# Patient Record
Sex: FEMALE | Race: WHITE | NOT HISPANIC OR LATINO | Employment: OTHER | URBAN - NONMETROPOLITAN AREA
[De-identification: names, ages, dates, MRNs, and addresses within clinical notes are randomized per-mention and may not be internally consistent; named-entity substitution may affect disease eponyms.]

---

## 2024-06-11 RX ORDER — DILTIAZEM HYDROCHLORIDE 60 MG/1
60 TABLET, FILM COATED ORAL DAILY
COMMUNITY

## 2024-06-11 RX ORDER — LISINOPRIL 20 MG/1
20 TABLET ORAL 2 TIMES DAILY
COMMUNITY

## 2024-06-11 RX ORDER — PROPRANOLOL HCL 60 MG
60 CAPSULE, EXTENDED RELEASE 24HR ORAL DAILY
COMMUNITY

## 2024-06-11 RX ORDER — WARFARIN SODIUM 5 MG/1
5 TABLET ORAL
COMMUNITY

## 2024-06-11 RX ORDER — LEVOTHYROXINE SODIUM 0.1 MG/1
100 TABLET ORAL DAILY
COMMUNITY

## 2024-06-11 RX ORDER — FERROUS SULFATE 325(65) MG
325 TABLET ORAL
COMMUNITY

## 2024-06-11 RX ORDER — FUROSEMIDE 40 MG/1
40 TABLET ORAL DAILY
COMMUNITY

## 2024-06-17 ENCOUNTER — HOSPITAL ENCOUNTER (OUTPATIENT)
Dept: CARDIOLOGY | Facility: HOSPITAL | Age: 89
Discharge: HOME | End: 2024-06-17
Payer: MEDICARE

## 2024-06-17 ENCOUNTER — OFFICE VISIT (OUTPATIENT)
Dept: CARDIOLOGY | Facility: CLINIC | Age: 89
End: 2024-06-17
Payer: MEDICARE

## 2024-06-17 VITALS
HEART RATE: 62 BPM | WEIGHT: 135 LBS | BODY MASS INDEX: 19.33 KG/M2 | DIASTOLIC BLOOD PRESSURE: 73 MMHG | SYSTOLIC BLOOD PRESSURE: 140 MMHG | OXYGEN SATURATION: 94 % | HEIGHT: 70 IN

## 2024-06-17 DIAGNOSIS — I36.1 NONRHEUMATIC TRICUSPID VALVE REGURGITATION: ICD-10-CM

## 2024-06-17 DIAGNOSIS — I35.0 NONRHEUMATIC AORTIC VALVE STENOSIS: Primary | ICD-10-CM

## 2024-06-17 DIAGNOSIS — I35.0 SEVERE AORTIC STENOSIS: ICD-10-CM

## 2024-06-17 DIAGNOSIS — I48.21 PERMANENT ATRIAL FIBRILLATION: ICD-10-CM

## 2024-06-17 PROCEDURE — 1159F MED LIST DOCD IN RCRD: CPT | Performed by: INTERNAL MEDICINE

## 2024-06-17 PROCEDURE — 1160F RVW MEDS BY RX/DR IN RCRD: CPT | Performed by: INTERNAL MEDICINE

## 2024-06-17 PROCEDURE — 93000 ELECTROCARDIOGRAM COMPLETE: CPT | Performed by: INTERNAL MEDICINE

## 2024-06-17 PROCEDURE — 99204 OFFICE O/P NEW MOD 45 MIN: CPT | Performed by: INTERNAL MEDICINE

## 2024-06-17 NOTE — PROGRESS NOTES
"    Subjective:     Encounter Date:06/17/2024      Patient ID: Juany Elizabeth is a 88 y.o. female with valvular heart disease including aortic valve stenosis, tricuspid valve regurgitation, also permanent atrial fibrillation, referred here for further evaluation of valvular heart disease.  The patient has previously seen Dr. Rene Maurer at an outside facility.    Chief Complaint: \" I feel terrible.\"    History of Present Illness    This is an 88-year-old female who presents today for further evaluation management of valvular heart disease.  The patient notes that she was hospitalized in May for the first time ever with fluid overload.  She was diuresed approximately 15 pounds and felt much better.  She says that earlier in the year, she was having issues with nausea and abdominal pain and was told she might need a gallbladder out was sent to cardiology for further evaluation.  The patient has a longstanding history of atrial fibrillation and has undergone an ablation that was not successful therefore she has permanent atrial fibrillation.  She is followed by Dr. Diego and is chronically anticoagulated with warfarin.  She denies having any palpitations at this time.  During her workup by cardiology for her possible gallbladder issue, she was found to have valvular heart disease.  Then subsequently, she was hospitalized with what sounds like heart failure with significant volume overload, weight gain, edema, shortness of breath.  The patient notes that since that time, she has had no trouble with significant shortness of breath or dyspnea with exertion.  She does have some mild edema.  She has not noticed rapid weight gain but says that she fears that the fluid will accumulate once again.      Current Outpatient Medications:     dilTIAZem (CARDIZEM) 60 MG tablet, Take 1 tablet by mouth Daily., Disp: , Rfl:     ferrous sulfate 325 (65 FE) MG tablet, Take 1 tablet by mouth Daily With Breakfast., Disp: , Rfl:     " furosemide (LASIX) 40 MG tablet, Take 1 tablet by mouth Daily., Disp: , Rfl:     levothyroxine (SYNTHROID, LEVOTHROID) 100 MCG tablet, Take 1 tablet by mouth Daily., Disp: , Rfl:     lisinopril (PRINIVIL,ZESTRIL) 20 MG tablet, Take 1 tablet by mouth 2 (Two) Times a Day., Disp: , Rfl:     propranolol LA (INDERAL LA) 60 MG 24 hr capsule, Take 1 capsule by mouth Daily., Disp: , Rfl:     warfarin (COUMADIN) 5 MG tablet, Take 1 tablet by mouth Daily., Disp: , Rfl:     No Known Allergies    Social History     Tobacco Use    Smoking status: Never    Smokeless tobacco: Never   Substance Use Topics    Alcohol use: Never     Review of Systems   Constitutional: Positive for malaise/fatigue. Negative for fever, weight gain and weight loss.   Cardiovascular:  Positive for dyspnea on exertion and leg swelling. Negative for chest pain, claudication, orthopnea, palpitations, paroxysmal nocturnal dyspnea and syncope.   Respiratory:  Positive for shortness of breath. Negative for cough and wheezing.    Endocrine: Negative for cold intolerance and heat intolerance.   Hematologic/Lymphatic: Negative for bleeding problem. Does not bruise/bleed easily.   Gastrointestinal:  Negative for abdominal pain, hematemesis, hematochezia, melena, nausea and vomiting.   Neurological:  Positive for weakness. Negative for dizziness, headaches and loss of balance.   All other systems reviewed and are negative.      ECG 12 Lead    Date/Time: 6/17/2024 3:11 PM  Performed by: Jerry Kim MD    Authorized by: Jerry Kim MD  Comparison: compared with previous ECG   Similar to previous ECG  Rhythm: atrial fibrillation  Rate: normal  BPM: 62  Conduction: conduction normal  QRS axis: left  Other findings: non-specific ST-T wave changes and poor R wave progression    Clinical impression: abnormal EKG           Objective:     Vitals reviewed.   Constitutional:       General: Not in acute distress.     Appearance: Well-developed and not  "in distress. Chronically ill-appearing.   Eyes:      Extraocular Movements: Extraocular movements intact.      Pupils: Pupils are equal, round, and reactive to light.   HENT:      Head: Normocephalic and atraumatic.    Mouth/Throat:      Pharynx: Oropharynx is clear.   Neck:      Vascular: No carotid bruit or JVD.   Pulmonary:      Effort: Pulmonary effort is normal.      Breath sounds: Normal breath sounds. No wheezing. No rhonchi. No rales.   Cardiovascular:      Normal rate. Irregularly irregular rhythm.      Murmurs: There is a grade 2/6 harsh midsystolic murmur at the URSB.      No gallop.    Pulses:     Intact distal pulses.   Edema:     Peripheral edema present.  Abdominal:      General: Bowel sounds are normal. There is no distension.      Palpations: Abdomen is soft.      Tenderness: There is no abdominal tenderness.   Musculoskeletal: Normal range of motion.      Extremities: No clubbing present.     Cervical back: Normal range of motion and neck supple. Skin:     General: Skin is warm and dry. There is no cyanosis.      Findings: No erythema or rash.   Neurological:      Mental Status: Alert and oriented to person, place, and time.      Cranial Nerves: No cranial nerve deficit.      Motor: Tremor present.       /73   Pulse 62   Ht 177.8 cm (70\")   Wt 61.2 kg (135 lb)   SpO2 94%   BMI 19.37 kg/m²     Data/Lab Review:     I did review the echocardiogram that was performed at an outside facility on 1/29/2024 and after the review my interpretation is as follows: Mild concentric left ventricular hypertrophy.  Preserved left ventricular systolic function.  Mild mitral annular calcification with mild mitral valve regurgitation.  Aortic valve is thickened and calcified.  There appears to be trace aortic valve regurgitation.  Aortic valve hemodynamic metrics support moderate aortic valve stenosis but likely close to moderate to severe.  No peak velocity exceeds 4 m/s, no mean gradient exceeds 40 mm/s " and dimensionless index does not appear to be less than 0.25.  There appears to be likely severe tricuspid valve regurgitation with severe right atrial enlargement.  By TAPSE, right ventricular systolic function appears to be preserved however the right ventricular cavity appears to be mildly dilated.      Assessment:          Diagnosis Plan   1. Nonrheumatic aortic valve stenosis  ECG 12 Lead      2. Nonrheumatic tricuspid valve regurgitation        3. Permanent atrial fibrillation               Plan:       1.  Nonrheumatic aortic valve stenosis: The patient was referred here for possible severe aortic valve stenosis.  Looking at her echocardiogram, the aortic valve appears to be moderate to severely stenotic but does not quite fulfill all criteria to be categorized as severely stenotic.  We did discuss that TAVR would be indicated for severe symptomatic aortic valve stenosis but not necessarily moderate to severe.  Now, the presence of mixed valvular disease might complicate things for this patient as well.  As, simply fixing the aortic valve without considering therapy for the tricuspid valve might be unwise given that the patient's shortness of breath, fluid retention, etc. may be just as significantly contributed to by tricuspid valve regurgitation with the severity that this patient has, as would her aortic valve stenosis.  I recommended a repeat echocardiogram.  The patient very frankly said that she did not want to get another echocardiogram.  I also discussed that if she did not want to have another echocardiogram, then she certainly would not be in favor most likely of other testing which would include CT scan, heart catheterization or any other testing that might be needed.  The patient was very hesitant to proceed.  She also said that she would like her care to be at [facility] in [place] if at all possible as she has a longstanding relationship with Dr. Diego but was told that cardiology there would  not see her unless she had a ALIZA first.  I discussed that I did not think this was absolutely the case but that likely the physicians there were trying to decrease the amount of trips in and out of [place].  It may simply be that the patient's case could be reviewed by the physicians there and a determination made about whether or not they could help with her case.  Therefore, the patient and family will try to discuss further with Dr. Diego about potentially reviewing the case with structural cardiology at [place].  Given the patient's absolute hesitation to proceed with any noninvasive testing, I did very frankly tell the patient that I would not be in favor of valve replacement at our facility at this time.  The patient asked seemed relieved with this.  I honestly cannot tell if she is in favor of proceeding with invasive therapies for her valvular heart disease or whether she simply wants to take more of a conservative approach.  We did discuss that if she were to ever get care at our facility, I would recommend another echocardiogram simply to define the severity of her aortic valve as being severe before proceeding with something like a transcatheter aortic valve replacement.  We also discussed that at this time we do not have transcatheter therapies for tricuspid valve regurgitation here that they do have in [place].    2.  Nonrheumatic tricuspid valve regurgitation: The patient appears to have severe tricuspid valve regurgitation.  She may benefit from Tri clip for repair of tricuspid valve regurgitation.  Unfortunately, this is not offered in our facility at this time.  She may benefit from seeking care, as she desires, in [place] at [facility] as they likely offer at this therapy there along with therapies for aortic valve disease.    3.  Permanent atrial fibrillation: The patient has been followed by Dr. Diego for a number of years.  She remains anticoagulated.  She is tolerating this therapy  well.    [id number] SCORE   [id number] Score: 5 (6/17/2024  3:20 PM)    We we will plan to see the patient back in our office as needed as she will likely seek care at [place].

## 2024-11-22 ENCOUNTER — APPOINTMENT (OUTPATIENT)
Dept: GENERAL RADIOLOGY | Age: 89
DRG: 480 | End: 2024-11-22
Payer: MEDICARE

## 2024-11-22 ENCOUNTER — APPOINTMENT (OUTPATIENT)
Dept: CT IMAGING | Age: 89
DRG: 480 | End: 2024-11-22
Payer: MEDICARE

## 2024-11-22 ENCOUNTER — HOSPITAL ENCOUNTER (INPATIENT)
Age: 89
LOS: 2 days | DRG: 480 | End: 2024-11-24
Attending: EMERGENCY MEDICINE | Admitting: INTERNAL MEDICINE
Payer: MEDICARE

## 2024-11-22 DIAGNOSIS — S72.142A INTERTROCHANTERIC FRACTURE OF LEFT FEMUR, CLOSED, INITIAL ENCOUNTER (HCC): Primary | ICD-10-CM

## 2024-11-22 DIAGNOSIS — I48.91 ATRIAL FIBRILLATION, UNSPECIFIED TYPE (HCC): ICD-10-CM

## 2024-11-22 PROBLEM — S72.022A: Status: ACTIVE | Noted: 2024-11-22

## 2024-11-22 LAB
25(OH)D3 SERPL-MCNC: 21.6 NG/ML
ABO/RH: NORMAL
ALBUMIN SERPL-MCNC: 3.5 G/DL (ref 3.5–5.2)
ALP SERPL-CCNC: 131 U/L (ref 35–104)
ALT SERPL-CCNC: 16 U/L (ref 5–33)
ANION GAP SERPL CALCULATED.3IONS-SCNC: 9 MMOL/L (ref 7–19)
ANTIBODY SCREEN: NORMAL
APTT PPP: 42.6 SEC (ref 26–36.2)
AST SERPL-CCNC: 25 U/L (ref 5–32)
BACTERIA URNS QL MICRO: NEGATIVE /HPF
BASOPHILS # BLD: 0.1 K/UL (ref 0–0.2)
BASOPHILS NFR BLD: 0.7 % (ref 0–1)
BILIRUB SERPL-MCNC: 1.4 MG/DL (ref 0.2–1.2)
BILIRUB UR QL STRIP: NEGATIVE
BNP BLD-MCNC: 6706 PG/ML (ref 0–449)
BUN SERPL-MCNC: 18 MG/DL (ref 8–23)
CALCIUM SERPL-MCNC: 8.3 MG/DL (ref 8.8–10.2)
CHLORIDE SERPL-SCNC: 97 MMOL/L (ref 98–111)
CLARITY UR: CLEAR
CO2 SERPL-SCNC: 31 MMOL/L (ref 22–29)
COLOR UR: YELLOW
CREAT SERPL-MCNC: 0.6 MG/DL (ref 0.5–0.9)
CRYSTALS URNS MICRO: NORMAL /HPF
EOSINOPHIL # BLD: 0.3 K/UL (ref 0–0.6)
EOSINOPHIL NFR BLD: 4.4 % (ref 0–5)
EPI CELLS #/AREA URNS AUTO: 0 /HPF (ref 0–5)
ERYTHROCYTE [DISTWIDTH] IN BLOOD BY AUTOMATED COUNT: 15.6 % (ref 11.5–14.5)
GLUCOSE SERPL-MCNC: 104 MG/DL (ref 70–99)
GLUCOSE UR STRIP.AUTO-MCNC: NEGATIVE MG/DL
HCT VFR BLD AUTO: 33.3 % (ref 37–47)
HGB BLD-MCNC: 10.5 G/DL (ref 12–16)
HGB UR STRIP.AUTO-MCNC: ABNORMAL MG/L
HYALINE CASTS #/AREA URNS AUTO: 0 /HPF (ref 0–8)
IMM GRANULOCYTES # BLD: 0 K/UL
INR PPP: 2.68 (ref 0.88–1.18)
KETONES UR STRIP.AUTO-MCNC: NEGATIVE MG/DL
LEUKOCYTE ESTERASE UR QL STRIP.AUTO: NEGATIVE
LYMPHOCYTES # BLD: 0.8 K/UL (ref 1.1–4.5)
LYMPHOCYTES NFR BLD: 10.7 % (ref 20–40)
MAGNESIUM SERPL-MCNC: 1.8 MG/DL (ref 1.6–2.4)
MCH RBC QN AUTO: 31.5 PG (ref 27–31)
MCHC RBC AUTO-ENTMCNC: 31.5 G/DL (ref 33–37)
MCV RBC AUTO: 100 FL (ref 81–99)
MONOCYTES # BLD: 0.7 K/UL (ref 0–0.9)
MONOCYTES NFR BLD: 9.1 % (ref 0–10)
NEUTROPHILS # BLD: 5.7 K/UL (ref 1.5–7.5)
NEUTS SEG NFR BLD: 74.6 % (ref 50–65)
NITRITE UR QL STRIP.AUTO: NEGATIVE
PH UR STRIP.AUTO: 7.5 [PH] (ref 5–8)
PHOSPHATE SERPL-MCNC: 3.4 MG/DL (ref 2.5–4.5)
PLATELET # BLD AUTO: 108 K/UL (ref 130–400)
PMV BLD AUTO: 10.7 FL (ref 9.4–12.3)
POTASSIUM SERPL-SCNC: 3.6 MMOL/L (ref 3.5–5)
PROT SERPL-MCNC: 6.6 G/DL (ref 6.4–8.3)
PROT UR STRIP.AUTO-MCNC: NEGATIVE MG/DL
PROTHROMBIN TIME: 27.9 SEC (ref 12–14.6)
RBC # BLD AUTO: 3.33 M/UL (ref 4.2–5.4)
RBC #/AREA URNS AUTO: 4 /HPF (ref 0–4)
SODIUM SERPL-SCNC: 137 MMOL/L (ref 136–145)
SP GR UR STRIP.AUTO: 1.01 (ref 1–1.03)
TROPONIN, HIGH SENSITIVITY: 47 NG/L (ref 0–14)
UROBILINOGEN UR STRIP.AUTO-MCNC: 1 E.U./DL
WBC # BLD AUTO: 7.6 K/UL (ref 4.8–10.8)
WBC #/AREA URNS AUTO: 1 /HPF (ref 0–5)

## 2024-11-22 PROCEDURE — 99285 EMERGENCY DEPT VISIT HI MDM: CPT

## 2024-11-22 PROCEDURE — 70450 CT HEAD/BRAIN W/O DYE: CPT

## 2024-11-22 PROCEDURE — 85025 COMPLETE CBC W/AUTO DIFF WBC: CPT

## 2024-11-22 PROCEDURE — 82306 VITAMIN D 25 HYDROXY: CPT

## 2024-11-22 PROCEDURE — 6360000002 HC RX W HCPCS: Performed by: HOSPITALIST

## 2024-11-22 PROCEDURE — P9059 PLASMA, FRZ BETWEEN 8-24HOUR: HCPCS

## 2024-11-22 PROCEDURE — 80053 COMPREHEN METABOLIC PANEL: CPT

## 2024-11-22 PROCEDURE — P9073 PLATELETS PHERESIS PATH REDU: HCPCS

## 2024-11-22 PROCEDURE — 1200000000 HC SEMI PRIVATE

## 2024-11-22 PROCEDURE — 36415 COLL VENOUS BLD VENIPUNCTURE: CPT

## 2024-11-22 PROCEDURE — 96374 THER/PROPH/DIAG INJ IV PUSH: CPT

## 2024-11-22 PROCEDURE — 6360000002 HC RX W HCPCS: Performed by: EMERGENCY MEDICINE

## 2024-11-22 PROCEDURE — 86901 BLOOD TYPING SEROLOGIC RH(D): CPT

## 2024-11-22 PROCEDURE — 83735 ASSAY OF MAGNESIUM: CPT

## 2024-11-22 PROCEDURE — 86850 RBC ANTIBODY SCREEN: CPT

## 2024-11-22 PROCEDURE — 72125 CT NECK SPINE W/O DYE: CPT

## 2024-11-22 PROCEDURE — 71045 X-RAY EXAM CHEST 1 VIEW: CPT

## 2024-11-22 PROCEDURE — P9016 RBC LEUKOCYTES REDUCED: HCPCS

## 2024-11-22 PROCEDURE — 85610 PROTHROMBIN TIME: CPT

## 2024-11-22 PROCEDURE — 84484 ASSAY OF TROPONIN QUANT: CPT

## 2024-11-22 PROCEDURE — 85730 THROMBOPLASTIN TIME PARTIAL: CPT

## 2024-11-22 PROCEDURE — 86923 COMPATIBILITY TEST ELECTRIC: CPT

## 2024-11-22 PROCEDURE — 84100 ASSAY OF PHOSPHORUS: CPT

## 2024-11-22 PROCEDURE — 83880 ASSAY OF NATRIURETIC PEPTIDE: CPT

## 2024-11-22 PROCEDURE — 81001 URINALYSIS AUTO W/SCOPE: CPT

## 2024-11-22 PROCEDURE — 73502 X-RAY EXAM HIP UNI 2-3 VIEWS: CPT

## 2024-11-22 PROCEDURE — 86900 BLOOD TYPING SEROLOGIC ABO: CPT

## 2024-11-22 RX ORDER — LISINOPRIL 20 MG/1
20 TABLET ORAL DAILY
Status: DISCONTINUED | OUTPATIENT
Start: 2024-11-24 | End: 2024-11-24 | Stop reason: HOSPADM

## 2024-11-22 RX ORDER — PROPRANOLOL HYDROCHLORIDE 60 MG/1
60 CAPSULE, EXTENDED RELEASE ORAL DAILY
Status: DISCONTINUED | OUTPATIENT
Start: 2024-11-23 | End: 2024-11-24 | Stop reason: HOSPADM

## 2024-11-22 RX ORDER — LIDOCAINE HYDROCHLORIDE 20 MG/ML
JELLY TOPICAL PRN
Status: DISCONTINUED | OUTPATIENT
Start: 2024-11-22 | End: 2024-11-24 | Stop reason: HOSPADM

## 2024-11-22 RX ORDER — SODIUM CHLORIDE 9 MG/ML
INJECTION, SOLUTION INTRAVENOUS PRN
Status: DISCONTINUED | OUTPATIENT
Start: 2024-11-22 | End: 2024-11-24

## 2024-11-22 RX ORDER — ONDANSETRON 2 MG/ML
4 INJECTION INTRAMUSCULAR; INTRAVENOUS EVERY 6 HOURS PRN
Status: DISCONTINUED | OUTPATIENT
Start: 2024-11-22 | End: 2024-11-24 | Stop reason: HOSPADM

## 2024-11-22 RX ORDER — WARFARIN SODIUM 2.5 MG/1
2.5 TABLET ORAL
COMMUNITY

## 2024-11-22 RX ORDER — OXYCODONE HYDROCHLORIDE 5 MG/1
5 TABLET ORAL EVERY 4 HOURS PRN
Status: DISCONTINUED | OUTPATIENT
Start: 2024-11-22 | End: 2024-11-24 | Stop reason: HOSPADM

## 2024-11-22 RX ORDER — POTASSIUM CHLORIDE 7.45 MG/ML
10 INJECTION INTRAVENOUS PRN
Status: DISCONTINUED | OUTPATIENT
Start: 2024-11-22 | End: 2024-11-24 | Stop reason: HOSPADM

## 2024-11-22 RX ORDER — ACETAMINOPHEN 500 MG
1000 TABLET ORAL EVERY 8 HOURS SCHEDULED
Status: DISCONTINUED | OUTPATIENT
Start: 2024-11-22 | End: 2024-11-24 | Stop reason: HOSPADM

## 2024-11-22 RX ORDER — OXYCODONE HYDROCHLORIDE 10 MG/1
10 TABLET ORAL EVERY 4 HOURS PRN
Status: DISCONTINUED | OUTPATIENT
Start: 2024-11-22 | End: 2024-11-24 | Stop reason: HOSPADM

## 2024-11-22 RX ORDER — TRANEXAMIC ACID 10 MG/ML
1000 INJECTION, SOLUTION INTRAVENOUS
Status: DISCONTINUED | OUTPATIENT
Start: 2024-11-23 | End: 2024-11-23 | Stop reason: HOSPADM

## 2024-11-22 RX ORDER — ONDANSETRON 2 MG/ML
4 INJECTION INTRAMUSCULAR; INTRAVENOUS ONCE
Status: COMPLETED | OUTPATIENT
Start: 2024-11-22 | End: 2024-11-22

## 2024-11-22 RX ORDER — CALCIUM CARBONATE 500 MG/1
500 TABLET, CHEWABLE ORAL 3 TIMES DAILY PRN
Status: DISCONTINUED | OUTPATIENT
Start: 2024-11-22 | End: 2024-11-24 | Stop reason: HOSPADM

## 2024-11-22 RX ORDER — DILTIAZEM HCL 60 MG
60 TABLET ORAL 2 TIMES DAILY
Status: DISCONTINUED | OUTPATIENT
Start: 2024-11-22 | End: 2024-11-24 | Stop reason: HOSPADM

## 2024-11-22 RX ORDER — POTASSIUM CHLORIDE 1500 MG/1
40 TABLET, EXTENDED RELEASE ORAL PRN
Status: DISCONTINUED | OUTPATIENT
Start: 2024-11-22 | End: 2024-11-24 | Stop reason: HOSPADM

## 2024-11-22 RX ORDER — PROMETHAZINE HYDROCHLORIDE 25 MG/ML
12.5 INJECTION, SOLUTION INTRAMUSCULAR; INTRAVENOUS EVERY 6 HOURS PRN
Status: DISCONTINUED | OUTPATIENT
Start: 2024-11-22 | End: 2024-11-24 | Stop reason: HOSPADM

## 2024-11-22 RX ORDER — MORPHINE SULFATE 2 MG/ML
2 INJECTION, SOLUTION INTRAMUSCULAR; INTRAVENOUS EVERY 4 HOURS PRN
Status: DISCONTINUED | OUTPATIENT
Start: 2024-11-22 | End: 2024-11-24 | Stop reason: HOSPADM

## 2024-11-22 RX ORDER — PROPRANOLOL HYDROCHLORIDE 60 MG/1
60 CAPSULE, EXTENDED RELEASE ORAL DAILY
COMMUNITY

## 2024-11-22 RX ORDER — FUROSEMIDE 20 MG/1
20 TABLET ORAL 2 TIMES DAILY
COMMUNITY

## 2024-11-22 RX ORDER — MORPHINE SULFATE 4 MG/ML
4 INJECTION, SOLUTION INTRAMUSCULAR; INTRAVENOUS EVERY 4 HOURS PRN
Status: DISCONTINUED | OUTPATIENT
Start: 2024-11-22 | End: 2024-11-24 | Stop reason: HOSPADM

## 2024-11-22 RX ORDER — DILTIAZEM HCL 60 MG
60 TABLET ORAL 2 TIMES DAILY
COMMUNITY

## 2024-11-22 RX ORDER — LISINOPRIL 20 MG/1
20 TABLET ORAL DAILY
COMMUNITY

## 2024-11-22 RX ORDER — LEVOTHYROXINE SODIUM 100 UG/1
100 TABLET ORAL DAILY
COMMUNITY

## 2024-11-22 RX ORDER — FENTANYL CITRATE 50 UG/ML
25 INJECTION, SOLUTION INTRAMUSCULAR; INTRAVENOUS ONCE
Status: COMPLETED | OUTPATIENT
Start: 2024-11-22 | End: 2024-11-22

## 2024-11-22 RX ORDER — SODIUM CHLORIDE 0.9 % (FLUSH) 0.9 %
5-40 SYRINGE (ML) INJECTION PRN
Status: DISCONTINUED | OUTPATIENT
Start: 2024-11-22 | End: 2024-11-24 | Stop reason: HOSPADM

## 2024-11-22 RX ORDER — METAXALONE 800 MG/1
800 TABLET ORAL EVERY 8 HOURS PRN
Status: DISCONTINUED | OUTPATIENT
Start: 2024-11-22 | End: 2024-11-24 | Stop reason: HOSPADM

## 2024-11-22 RX ORDER — SODIUM CHLORIDE 0.9 % (FLUSH) 0.9 %
5-40 SYRINGE (ML) INJECTION EVERY 12 HOURS SCHEDULED
Status: DISCONTINUED | OUTPATIENT
Start: 2024-11-22 | End: 2024-11-24 | Stop reason: HOSPADM

## 2024-11-22 RX ORDER — OXYCODONE HYDROCHLORIDE 5 MG/1
2.5 TABLET ORAL EVERY 4 HOURS PRN
Status: DISCONTINUED | OUTPATIENT
Start: 2024-11-22 | End: 2024-11-24 | Stop reason: HOSPADM

## 2024-11-22 RX ORDER — FUROSEMIDE 20 MG/1
20 TABLET ORAL 2 TIMES DAILY
Status: DISCONTINUED | OUTPATIENT
Start: 2024-11-22 | End: 2024-11-24 | Stop reason: HOSPADM

## 2024-11-22 RX ORDER — MORPHINE SULFATE 2 MG/ML
1 INJECTION, SOLUTION INTRAMUSCULAR; INTRAVENOUS EVERY 4 HOURS PRN
Status: DISCONTINUED | OUTPATIENT
Start: 2024-11-22 | End: 2024-11-24 | Stop reason: HOSPADM

## 2024-11-22 RX ORDER — PROCHLORPERAZINE 25 MG
25 SUPPOSITORY, RECTAL RECTAL EVERY 12 HOURS PRN
Status: DISCONTINUED | OUTPATIENT
Start: 2024-11-22 | End: 2024-11-24 | Stop reason: HOSPADM

## 2024-11-22 RX ORDER — MORPHINE SULFATE 4 MG/ML
4 INJECTION, SOLUTION INTRAMUSCULAR; INTRAVENOUS ONCE
Status: COMPLETED | OUTPATIENT
Start: 2024-11-22 | End: 2024-11-22

## 2024-11-22 RX ORDER — LEVOTHYROXINE SODIUM 100 UG/1
100 TABLET ORAL
Status: DISCONTINUED | OUTPATIENT
Start: 2024-11-23 | End: 2024-11-24 | Stop reason: HOSPADM

## 2024-11-22 RX ORDER — POLYETHYLENE GLYCOL 3350 17 G/17G
17 POWDER, FOR SOLUTION ORAL DAILY PRN
Status: DISCONTINUED | OUTPATIENT
Start: 2024-11-22 | End: 2024-11-23

## 2024-11-22 RX ORDER — NALOXONE HYDROCHLORIDE 0.4 MG/ML
0.4 INJECTION, SOLUTION INTRAMUSCULAR; INTRAVENOUS; SUBCUTANEOUS PRN
Status: DISCONTINUED | OUTPATIENT
Start: 2024-11-22 | End: 2024-11-24 | Stop reason: HOSPADM

## 2024-11-22 RX ORDER — MECOBALAMIN 5000 MCG
5 TABLET,DISINTEGRATING ORAL NIGHTLY PRN
Status: DISCONTINUED | OUTPATIENT
Start: 2024-11-22 | End: 2024-11-24 | Stop reason: HOSPADM

## 2024-11-22 RX ORDER — MAGNESIUM SULFATE IN WATER 40 MG/ML
2000 INJECTION, SOLUTION INTRAVENOUS PRN
Status: DISCONTINUED | OUTPATIENT
Start: 2024-11-22 | End: 2024-11-24 | Stop reason: HOSPADM

## 2024-11-22 RX ADMIN — PROMETHAZINE HYDROCHLORIDE 12.5 MG: 25 INJECTION INTRAMUSCULAR; INTRAVENOUS at 23:07

## 2024-11-22 RX ADMIN — MORPHINE SULFATE 4 MG: 4 INJECTION, SOLUTION INTRAMUSCULAR; INTRAVENOUS at 20:08

## 2024-11-22 RX ADMIN — ONDANSETRON 4 MG: 2 INJECTION INTRAMUSCULAR; INTRAVENOUS at 20:08

## 2024-11-22 RX ADMIN — FENTANYL CITRATE 25 MCG: 50 INJECTION INTRAMUSCULAR; INTRAVENOUS at 18:04

## 2024-11-22 ASSESSMENT — PAIN SCALES - GENERAL: PAINLEVEL_OUTOF10: 8

## 2024-11-23 ENCOUNTER — APPOINTMENT (OUTPATIENT)
Dept: GENERAL RADIOLOGY | Age: 89
DRG: 480 | End: 2024-11-23
Payer: MEDICARE

## 2024-11-23 ENCOUNTER — ANESTHESIA (OUTPATIENT)
Dept: OPERATING ROOM | Age: 89
End: 2024-11-23
Payer: MEDICARE

## 2024-11-23 ENCOUNTER — ANESTHESIA EVENT (OUTPATIENT)
Dept: OPERATING ROOM | Age: 89
End: 2024-11-23
Payer: MEDICARE

## 2024-11-23 ENCOUNTER — APPOINTMENT (OUTPATIENT)
Age: 89
DRG: 480 | End: 2024-11-23
Attending: INTERNAL MEDICINE
Payer: MEDICARE

## 2024-11-23 PROBLEM — I48.91 ATRIAL FIBRILLATION (HCC): Status: ACTIVE | Noted: 2024-11-23

## 2024-11-23 PROBLEM — I35.0 NONRHEUMATIC AORTIC VALVE STENOSIS: Status: ACTIVE | Noted: 2024-11-23

## 2024-11-23 PROBLEM — R57.0 CARDIOGENIC SHOCK: Status: ACTIVE | Noted: 2024-11-23

## 2024-11-23 PROBLEM — S72.142A INTERTROCHANTERIC FRACTURE OF LEFT FEMUR, CLOSED, INITIAL ENCOUNTER (HCC): Status: ACTIVE | Noted: 2024-11-23

## 2024-11-23 PROBLEM — I50.811 ACUTE RIGHT-SIDED HEART FAILURE (HCC): Status: ACTIVE | Noted: 2024-11-23

## 2024-11-23 LAB
ALLENS TEST: ABNORMAL
ALLENS TEST: ABNORMAL
ANION GAP BLD CALC-SCNC: 18 MMOL/L
ANION GAP SERPL CALC-SCNC: 104 MEQ/L (ref 99–110)
ANION GAP SERPL CALCULATED.3IONS-SCNC: 12 MMOL/L (ref 7–19)
ANION GAP SERPL CALCULATED.3IONS-SCNC: 24 MMOL/L (ref 7–19)
BASE EXCESS ARTERIAL: -3 (ref -3–3)
BASE EXCESS ARTERIAL: -6.4 MMOL/L (ref -2–2)
BASE EXCESS ARTERIAL: -9.1 MMOL/L (ref -2–2)
BASOPHILS # BLD: 0 K/UL (ref 0–0.2)
BASOPHILS NFR BLD: 0.4 % (ref 0–1)
BLOOD BANK DISPENSE STATUS: NORMAL
BLOOD BANK PRODUCT CODE: NORMAL
BPU ID: NORMAL
BUN SERPL-MCNC: 22 MG/DL (ref 8–23)
BUN SERPL-MCNC: 23 MG/DL (ref 8–23)
CA-I BLD-SCNC: 1.31 MMOL/L (ref 1.1–1.3)
CALCIUM SERPL-MCNC: 7.4 MG/DL (ref 8.8–10.2)
CALCIUM SERPL-MCNC: 8 MG/DL (ref 8.8–10.2)
CARBOXYHEMOGLOBIN ARTERIAL: 2.7 % (ref 0–5)
CARBOXYHEMOGLOBIN ARTERIAL: 3.1 % (ref 0–5)
CHLORIDE SERPL-SCNC: 101 MMOL/L (ref 98–111)
CHLORIDE SERPL-SCNC: 106 MMOL/L (ref 98–111)
CO2 BLD CALC-SCNC: 22 MEQ/L (ref 21–32)
CO2 SERPL-SCNC: 15 MMOL/L (ref 22–29)
CO2 SERPL-SCNC: 27 MMOL/L (ref 22–29)
CONTINUOUS POSITIVE AIRWAY PRESSURE: 5
CREAT SERPL-MCNC: 0.6 MG/DL (ref 0.5–0.9)
CREAT SERPL-MCNC: 1.2 MG/DL (ref 0.3–1.3)
CREAT SERPL-MCNC: 1.2 MG/DL (ref 0.5–0.9)
DESCRIPTION BLOOD BANK: NORMAL
ECHO AV MEAN GRADIENT: 29 MMHG
ECHO AV MEAN VELOCITY: 2.6 M/S
ECHO AV PEAK GRADIENT: 50 MMHG
ECHO AV PEAK VELOCITY: 3.5 M/S
ECHO AV VTI: 56.1 CM
ECHO BSA: 1.76 M2
ECHO IVC PROX: 3.5 CM
ECHO LV EDV A2C: 63 ML
ECHO LV EDV A4C: 48 ML
ECHO LV EDV INDEX A4C: 27 ML/M2
ECHO LV EDV NDEX A2C: 35 ML/M2
ECHO LV EJECTION FRACTION A2C: 31 %
ECHO LV EJECTION FRACTION A4C: 50 %
ECHO LV EJECTION FRACTION BIPLANE: 42 % (ref 55–100)
ECHO LV ESV A2C: 43 ML
ECHO LV ESV A4C: 24 ML
ECHO LV ESV INDEX A2C: 24 ML/M2
ECHO LV ESV INDEX A4C: 13 ML/M2
ECHO RA AREA 4C: 41.8 CM2
ECHO RA END SYSTOLIC VOLUME APICAL 4 CHAMBER INDEX BSA: 103 ML/M2
ECHO RA VOLUME: 184 ML
ECHO RV BASAL DIMENSION: 5.2 CM
ECHO RV LONGITUDINAL DIMENSION: 7.6 CM
ECHO RV MID DIMENSION: 6.1 CM
ECHO RV TAPSE: 1.1 CM (ref 1.7–?)
ECHO TV REGURGITANT MAX VELOCITY: 2.32 M/S
ECHO TV REGURGITANT PEAK GRADIENT: 22 MMHG
EKG P AXIS: NORMAL DEGREES
EKG P-R INTERVAL: NORMAL MS
EKG Q-T INTERVAL: 426 MS
EKG QRS DURATION: 86 MS
EKG QTC CALCULATION (BAZETT): 445 MS
EKG T AXIS: 70 DEGREES
EOSINOPHIL # BLD: 0.1 K/UL (ref 0–0.6)
EOSINOPHIL NFR BLD: 1.3 % (ref 0–5)
ERYTHROCYTE [DISTWIDTH] IN BLOOD BY AUTOMATED COUNT: 15.7 % (ref 11.5–14.5)
ERYTHROCYTE [DISTWIDTH] IN BLOOD BY AUTOMATED COUNT: 17.4 % (ref 11.5–14.5)
GLUCOSE BLD-MCNC: 59 MG/DL (ref 70–99)
GLUCOSE BLD-MCNC: 70 MG/DL (ref 70–99)
GLUCOSE BLD-MCNC: 82 MG/DL (ref 70–99)
GLUCOSE BLD-MCNC: <25 MG/DL (ref 70–99)
GLUCOSE SERPL-MCNC: 20 MG/DL (ref 70–99)
GLUCOSE SERPL-MCNC: 95 MG/DL (ref 70–99)
HCO3 ARTERIAL: 17.5 MMOL/L (ref 22–26)
HCO3 ARTERIAL: 20.6 MMOL/L (ref 22–26)
HCT VFR BLD AUTO: 23 % (ref 37–47)
HCT VFR BLD AUTO: 24 % (ref 37–52)
HCT VFR BLD AUTO: 24.7 % (ref 37–47)
HCT VFR BLD AUTO: 26.4 % (ref 37–47)
HCT VFR BLD AUTO: 27.2 % (ref 37–47)
HEMOGLOBIN, ART, EXTENDED: 7.5 G/DL (ref 12–16)
HEMOGLOBIN, ART, EXTENDED: 9.6 G/DL (ref 12–16)
HGB BLD CALC-MCNC: 8.3 GM/DL (ref 12–18)
HGB BLD-MCNC: 7.1 G/DL (ref 12–16)
HGB BLD-MCNC: 7.6 G/DL (ref 12–16)
HGB BLD-MCNC: 8.2 G/DL (ref 12–16)
HGB BLD-MCNC: 8.3 G/DL (ref 12–16)
IMM GRANULOCYTES # BLD: 0 K/UL
INR PPP: 2.82 (ref 0.88–1.18)
LACTATE BLDV-SCNC: 4.9 MMOL/L (ref 0.5–1.9)
LYMPHOCYTES # BLD: 1 K/UL (ref 1.1–4.5)
LYMPHOCYTES NFR BLD: 9.7 % (ref 20–40)
MAGNESIUM SERPL-MCNC: 1.8 MG/DL (ref 1.6–2.4)
MCH RBC QN AUTO: 31.6 PG (ref 27–31)
MCH RBC QN AUTO: 31.8 PG (ref 27–31)
MCHC RBC AUTO-ENTMCNC: 30.9 G/DL (ref 33–37)
MCHC RBC AUTO-ENTMCNC: 31.4 G/DL (ref 33–37)
MCV RBC AUTO: 100.4 FL (ref 81–99)
MCV RBC AUTO: 103.1 FL (ref 81–99)
METHEMOGLOBIN ARTERIAL: 0.4 %
METHEMOGLOBIN ARTERIAL: 0.9 %
MODE: ABNORMAL
MONOCYTES # BLD: 1.1 K/UL (ref 0–0.9)
MONOCYTES NFR BLD: 11.2 % (ref 0–10)
NEUTROPHILS # BLD: 7.7 K/UL (ref 1.5–7.5)
NEUTS SEG NFR BLD: 77 % (ref 50–65)
O2 CONTENT ARTERIAL: 10.3 ML/DL
O2 CONTENT ARTERIAL: 13.1 ML/DL
O2 DELIVERY DEVICE: ABNORMAL
O2 SAT, ARTERIAL: 100 % (ref 93–100)
O2 SAT, ARTERIAL: 95.7 %
O2 SAT, ARTERIAL: 96 %
O2 THERAPY: ABNORMAL
O2 THERAPY: ABNORMAL
OXYGEN FLOW: 15
OXYGEN FLOW: 15
PCO2 ARTERIAL: 40 MM HG (ref 35–48)
PCO2 ARTERIAL: 40 MMHG (ref 35–45)
PCO2 ARTERIAL: 48 MMHG (ref 35–45)
PERFORMED ON: ABNORMAL
PERFORMED ON: NORMAL
PH ARTERIAL: 7.24 (ref 7.35–7.45)
PH ARTERIAL: 7.25 (ref 7.35–7.45)
PH ARTERIAL: 7.35 (ref 7.3–7.5)
PLATELET # BLD AUTO: 82 K/UL (ref 130–400)
PLATELET # BLD AUTO: 91 K/UL (ref 130–400)
PMV BLD AUTO: 10.3 FL (ref 9.4–12.3)
PMV BLD AUTO: 11.3 FL (ref 9.4–12.3)
PO2 ARTERIAL: 476 MM HG (ref 83–108)
PO2 ARTERIAL: 92 MMHG (ref 80–100)
PO2 ARTERIAL: 95 MMHG (ref 80–100)
POTASSIUM BLD-SCNC: 4 MMOL/L
POTASSIUM BLD-SCNC: 4.4 MMOL/L
POTASSIUM SERPL-SCNC: 3.6 MMOL/L (ref 3.5–5)
POTASSIUM SERPL-SCNC: 3.9 MMOL/L (ref 3.5–5)
POTASSIUM SERPL-SCNC: 4.5 MEQ/L (ref 3.5–5.1)
PROTHROMBIN TIME: 29 SEC (ref 12–14.6)
RBC # BLD AUTO: 2.23 M/UL (ref 4.2–5.4)
RBC # BLD AUTO: 2.63 M/UL (ref 4.2–5.4)
SAMPLE SOURCE: ABNORMAL
SAMPLE SOURCE: ABNORMAL
SODIUM BLD-SCNC: 144 MEQ/L (ref 136–145)
SODIUM SERPL-SCNC: 140 MMOL/L (ref 136–145)
SODIUM SERPL-SCNC: 145 MMOL/L (ref 136–145)
SPONT RATE(BPM): 18
TCO2 ARTERIAL: 23 MMOL/L
WBC # BLD AUTO: 10 K/UL (ref 4.8–10.8)
WBC # BLD AUTO: 12.7 K/UL (ref 4.8–10.8)

## 2024-11-23 PROCEDURE — P9045 ALBUMIN (HUMAN), 5%, 250 ML: HCPCS | Performed by: NURSE ANESTHETIST, CERTIFIED REGISTERED

## 2024-11-23 PROCEDURE — 3600000008 HC SURGERY OHS BASE: Performed by: SURGERY

## 2024-11-23 PROCEDURE — 3E033XZ INTRODUCTION OF VASOPRESSOR INTO PERIPHERAL VEIN, PERCUTANEOUS APPROACH: ICD-10-PCS | Performed by: STUDENT IN AN ORGANIZED HEALTH CARE EDUCATION/TRAINING PROGRAM

## 2024-11-23 PROCEDURE — C1894 INTRO/SHEATH, NON-LASER: HCPCS | Performed by: SURGERY

## 2024-11-23 PROCEDURE — 2580000003 HC RX 258: Performed by: SURGERY

## 2024-11-23 PROCEDURE — 80048 BASIC METABOLIC PNL TOTAL CA: CPT

## 2024-11-23 PROCEDURE — 2500000003 HC RX 250 WO HCPCS: Performed by: SURGERY

## 2024-11-23 PROCEDURE — 7100000001 HC PACU RECOVERY - ADDTL 15 MIN: Performed by: ORTHOPAEDIC SURGERY

## 2024-11-23 PROCEDURE — 82374 ASSAY BLOOD CARBON DIOXIDE: CPT

## 2024-11-23 PROCEDURE — 6360000002 HC RX W HCPCS: Performed by: SURGERY

## 2024-11-23 PROCEDURE — C1713 ANCHOR/SCREW BN/BN,TIS/BN: HCPCS | Performed by: ORTHOPAEDIC SURGERY

## 2024-11-23 PROCEDURE — 6360000002 HC RX W HCPCS: Performed by: NURSE ANESTHETIST, CERTIFIED REGISTERED

## 2024-11-23 PROCEDURE — 82435 ASSAY OF BLOOD CHLORIDE: CPT

## 2024-11-23 PROCEDURE — 3700000000 HC ANESTHESIA ATTENDED CARE: Performed by: SURGERY

## 2024-11-23 PROCEDURE — 84132 ASSAY OF SERUM POTASSIUM: CPT

## 2024-11-23 PROCEDURE — 84295 ASSAY OF SERUM SODIUM: CPT

## 2024-11-23 PROCEDURE — 82800 BLOOD PH: CPT

## 2024-11-23 PROCEDURE — 2580000003 HC RX 258: Performed by: ORTHOPAEDIC SURGERY

## 2024-11-23 PROCEDURE — 6360000002 HC RX W HCPCS: Performed by: ORTHOPAEDIC SURGERY

## 2024-11-23 PROCEDURE — 6360000002 HC RX W HCPCS: Performed by: ANESTHESIOLOGY

## 2024-11-23 PROCEDURE — 2000000000 HC ICU R&B

## 2024-11-23 PROCEDURE — 2500000003 HC RX 250 WO HCPCS

## 2024-11-23 PROCEDURE — 2500000003 HC RX 250 WO HCPCS: Performed by: INTERNAL MEDICINE

## 2024-11-23 PROCEDURE — 0QH706Z INSERTION OF INTRAMEDULLARY INTERNAL FIXATION DEVICE INTO LEFT UPPER FEMUR, OPEN APPROACH: ICD-10-PCS | Performed by: ORTHOPAEDIC SURGERY

## 2024-11-23 PROCEDURE — 85530 HEPARIN-PROTAMINE TOLERANCE: CPT | Performed by: SURGERY

## 2024-11-23 PROCEDURE — 82565 ASSAY OF CREATININE: CPT

## 2024-11-23 PROCEDURE — 30233N1 TRANSFUSION OF NONAUTOLOGOUS RED BLOOD CELLS INTO PERIPHERAL VEIN, PERCUTANEOUS APPROACH: ICD-10-PCS | Performed by: ANESTHESIOLOGY

## 2024-11-23 PROCEDURE — C1769 GUIDE WIRE: HCPCS | Performed by: ORTHOPAEDIC SURGERY

## 2024-11-23 PROCEDURE — 7100000000 HC PACU RECOVERY - FIRST 15 MIN: Performed by: ORTHOPAEDIC SURGERY

## 2024-11-23 PROCEDURE — 2700000000 HC OXYGEN THERAPY PER DAY

## 2024-11-23 PROCEDURE — 6360000002 HC RX W HCPCS: Performed by: STUDENT IN AN ORGANIZED HEALTH CARE EDUCATION/TRAINING PROGRAM

## 2024-11-23 PROCEDURE — B24BZZ4 ULTRASONOGRAPHY OF HEART WITH AORTA, TRANSESOPHAGEAL: ICD-10-PCS | Performed by: SURGERY

## 2024-11-23 PROCEDURE — 2500000003 HC RX 250 WO HCPCS: Performed by: NURSE ANESTHETIST, CERTIFIED REGISTERED

## 2024-11-23 PROCEDURE — 36415 COLL VENOUS BLD VENIPUNCTURE: CPT

## 2024-11-23 PROCEDURE — C1751 CATH, INF, PER/CENT/MIDLINE: HCPCS | Performed by: SURGERY

## 2024-11-23 PROCEDURE — 93306 TTE W/DOPPLER COMPLETE: CPT | Performed by: INTERNAL MEDICINE

## 2024-11-23 PROCEDURE — 73552 X-RAY EXAM OF FEMUR 2/>: CPT

## 2024-11-23 PROCEDURE — 94002 VENT MGMT INPAT INIT DAY: CPT

## 2024-11-23 PROCEDURE — 37799 UNLISTED PX VASCULAR SURGERY: CPT

## 2024-11-23 PROCEDURE — 36620 INSERTION CATHETER ARTERY: CPT | Performed by: ANESTHESIOLOGY

## 2024-11-23 PROCEDURE — 2709999900 HC NON-CHARGEABLE SUPPLY: Performed by: SURGERY

## 2024-11-23 PROCEDURE — 85025 COMPLETE CBC W/AUTO DIFF WBC: CPT

## 2024-11-23 PROCEDURE — 2580000003 HC RX 258: Performed by: HOSPITALIST

## 2024-11-23 PROCEDURE — 2580000003 HC RX 258: Performed by: NURSE ANESTHETIST, CERTIFIED REGISTERED

## 2024-11-23 PROCEDURE — C1729 CATH, DRAINAGE: HCPCS | Performed by: SURGERY

## 2024-11-23 PROCEDURE — 85610 PROTHROMBIN TIME: CPT

## 2024-11-23 PROCEDURE — 36430 TRANSFUSION BLD/BLD COMPNT: CPT

## 2024-11-23 PROCEDURE — 2720000010 HC SURG SUPPLY STERILE: Performed by: SURGERY

## 2024-11-23 PROCEDURE — 3600000018 HC SURGERY OHS ADDTL 15MIN: Performed by: SURGERY

## 2024-11-23 PROCEDURE — 85027 COMPLETE CBC AUTOMATED: CPT

## 2024-11-23 PROCEDURE — 85018 HEMOGLOBIN: CPT

## 2024-11-23 PROCEDURE — 85014 HEMATOCRIT: CPT

## 2024-11-23 PROCEDURE — 3700000001 HC ADD 15 MINUTES (ANESTHESIA): Performed by: SURGERY

## 2024-11-23 PROCEDURE — 93010 ELECTROCARDIOGRAM REPORT: CPT | Performed by: INTERNAL MEDICINE

## 2024-11-23 PROCEDURE — 82803 BLOOD GASES ANY COMBINATION: CPT

## 2024-11-23 PROCEDURE — 93306 TTE W/DOPPLER COMPLETE: CPT

## 2024-11-23 PROCEDURE — 83735 ASSAY OF MAGNESIUM: CPT

## 2024-11-23 PROCEDURE — 3700000000 HC ANESTHESIA ATTENDED CARE: Performed by: ORTHOPAEDIC SURGERY

## 2024-11-23 PROCEDURE — 82962 GLUCOSE BLOOD TEST: CPT

## 2024-11-23 PROCEDURE — 3700000001 HC ADD 15 MINUTES (ANESTHESIA): Performed by: ORTHOPAEDIC SURGERY

## 2024-11-23 PROCEDURE — 83605 ASSAY OF LACTIC ACID: CPT

## 2024-11-23 PROCEDURE — 3600000014 HC SURGERY LEVEL 4 ADDTL 15MIN: Performed by: ORTHOPAEDIC SURGERY

## 2024-11-23 PROCEDURE — 82330 ASSAY OF CALCIUM: CPT

## 2024-11-23 PROCEDURE — 71045 X-RAY EXAM CHEST 1 VIEW: CPT

## 2024-11-23 PROCEDURE — 0BJ08ZZ INSPECTION OF TRACHEOBRONCHIAL TREE, VIA NATURAL OR ARTIFICIAL OPENING ENDOSCOPIC: ICD-10-PCS | Performed by: SURGERY

## 2024-11-23 PROCEDURE — C1889 IMPLANT/INSERT DEVICE, NOC: HCPCS | Performed by: SURGERY

## 2024-11-23 PROCEDURE — A4217 STERILE WATER/SALINE, 500 ML: HCPCS | Performed by: SURGERY

## 2024-11-23 PROCEDURE — 85347 COAGULATION TIME ACTIVATED: CPT | Performed by: SURGERY

## 2024-11-23 PROCEDURE — 82947 ASSAY GLUCOSE BLOOD QUANT: CPT

## 2024-11-23 PROCEDURE — 5A02210 ASSISTANCE WITH CARDIAC OUTPUT USING BALLOON PUMP, CONTINUOUS: ICD-10-PCS | Performed by: SURGERY

## 2024-11-23 PROCEDURE — 2720000010 HC SURG SUPPLY STERILE: Performed by: ORTHOPAEDIC SURGERY

## 2024-11-23 PROCEDURE — C1776 JOINT DEVICE (IMPLANTABLE): HCPCS | Performed by: ORTHOPAEDIC SURGERY

## 2024-11-23 PROCEDURE — 6370000000 HC RX 637 (ALT 250 FOR IP): Performed by: HOSPITALIST

## 2024-11-23 PROCEDURE — 82810 BLOOD GASES O2 SAT ONLY: CPT

## 2024-11-23 PROCEDURE — 94760 N-INVAS EAR/PLS OXIMETRY 1: CPT

## 2024-11-23 PROCEDURE — 2580000003 HC RX 258

## 2024-11-23 PROCEDURE — 5A1221Z PERFORMANCE OF CARDIAC OUTPUT, CONTINUOUS: ICD-10-PCS | Performed by: SURGERY

## 2024-11-23 PROCEDURE — 2580000003 HC RX 258: Performed by: STUDENT IN AN ORGANIZED HEALTH CARE EDUCATION/TRAINING PROGRAM

## 2024-11-23 PROCEDURE — 93005 ELECTROCARDIOGRAM TRACING: CPT | Performed by: ANESTHESIOLOGY

## 2024-11-23 PROCEDURE — 3600000004 HC SURGERY LEVEL 4 BASE: Performed by: ORTHOPAEDIC SURGERY

## 2024-11-23 PROCEDURE — 2709999900 HC NON-CHARGEABLE SUPPLY: Performed by: ORTHOPAEDIC SURGERY

## 2024-11-23 PROCEDURE — 30233R1 TRANSFUSION OF NONAUTOLOGOUS PLATELETS INTO PERIPHERAL VEIN, PERCUTANEOUS APPROACH: ICD-10-PCS | Performed by: SURGERY

## 2024-11-23 PROCEDURE — 3600000090 HC PERFUSION PUMP ON: Performed by: SURGERY

## 2024-11-23 DEVICE — IMPLANTABLE DEVICE: Type: IMPLANTABLE DEVICE | Status: FUNCTIONAL

## 2024-11-23 DEVICE — CLIP LIG M BLU TI HRT SHP WIRE HORZ 6 CLIPS PER PK: Type: IMPLANTABLE DEVICE | Status: FUNCTIONAL

## 2024-11-23 DEVICE — GUIDEWIRE 3.2MM GUIDE WIRE 475MM STERILE: Type: IMPLANTABLE DEVICE | Status: FUNCTIONAL

## 2024-11-23 DEVICE — NAIL IM L420MM DIA12MM 130DEG LNG L PROX FEM GRN TI CANN: Type: IMPLANTABLE DEVICE | Status: FUNCTIONAL

## 2024-11-23 DEVICE — CLIP INT SM TI EZ LD LIG SYS WECK HORIZON: Type: IMPLANTABLE DEVICE | Status: FUNCTIONAL

## 2024-11-23 DEVICE — CABLE SURG L750MM DIA1.7MM CERCLAGE CO CHROM SMOOTH W/ CRMP: Type: IMPLANTABLE DEVICE | Status: FUNCTIONAL

## 2024-11-23 RX ORDER — NOREPINEPHRINE BITARTRATE 0.06 MG/ML
INJECTION, SOLUTION INTRAVENOUS
Status: COMPLETED
Start: 2024-11-23 | End: 2024-11-23

## 2024-11-23 RX ORDER — CEFAZOLIN SODIUM 1 G/3ML
INJECTION, POWDER, FOR SOLUTION INTRAMUSCULAR; INTRAVENOUS
Status: DISCONTINUED | OUTPATIENT
Start: 2024-11-23 | End: 2024-11-23 | Stop reason: SDUPTHER

## 2024-11-23 RX ORDER — DIPHENHYDRAMINE HYDROCHLORIDE 50 MG/ML
12.5 INJECTION INTRAMUSCULAR; INTRAVENOUS
Status: DISCONTINUED | OUTPATIENT
Start: 2024-11-23 | End: 2024-11-23 | Stop reason: HOSPADM

## 2024-11-23 RX ORDER — EPINEPHRINE 1 MG/ML
INJECTION, SOLUTION, CONCENTRATE INTRAVENOUS
Status: DISCONTINUED | OUTPATIENT
Start: 2024-11-23 | End: 2024-11-23 | Stop reason: SDUPTHER

## 2024-11-23 RX ORDER — PROTAMINE SULFATE 10 MG/ML
INJECTION, SOLUTION INTRAVENOUS
Status: DISCONTINUED | OUTPATIENT
Start: 2024-11-23 | End: 2024-11-23 | Stop reason: SDUPTHER

## 2024-11-23 RX ORDER — SODIUM CHLORIDE 9 MG/ML
INJECTION, SOLUTION INTRAVENOUS PRN
Status: DISCONTINUED | OUTPATIENT
Start: 2024-11-23 | End: 2024-11-24

## 2024-11-23 RX ORDER — SODIUM CHLORIDE 9 MG/ML
INJECTION, SOLUTION INTRAVENOUS PRN
Status: DISCONTINUED | OUTPATIENT
Start: 2024-11-23 | End: 2024-11-23 | Stop reason: HOSPADM

## 2024-11-23 RX ORDER — HYDROMORPHONE HYDROCHLORIDE 1 MG/ML
0.5 INJECTION, SOLUTION INTRAMUSCULAR; INTRAVENOUS; SUBCUTANEOUS EVERY 5 MIN PRN
Status: DISCONTINUED | OUTPATIENT
Start: 2024-11-23 | End: 2024-11-23 | Stop reason: HOSPADM

## 2024-11-23 RX ORDER — ONDANSETRON 2 MG/ML
4 INJECTION INTRAMUSCULAR; INTRAVENOUS EVERY 6 HOURS PRN
Status: DISCONTINUED | OUTPATIENT
Start: 2024-11-23 | End: 2024-11-24 | Stop reason: HOSPADM

## 2024-11-23 RX ORDER — MILRINONE LACTATE 0.2 MG/ML
.0625-.75 INJECTION, SOLUTION INTRAVENOUS CONTINUOUS
Status: DISCONTINUED | OUTPATIENT
Start: 2024-11-23 | End: 2024-11-24 | Stop reason: HOSPADM

## 2024-11-23 RX ORDER — LIDOCAINE HYDROCHLORIDE 10 MG/ML
INJECTION, SOLUTION INFILTRATION; PERINEURAL
Status: DISCONTINUED | OUTPATIENT
Start: 2024-11-23 | End: 2024-11-23 | Stop reason: SDUPTHER

## 2024-11-23 RX ORDER — DEXTROSE MONOHYDRATE 25 G/50ML
25 INJECTION, SOLUTION INTRAVENOUS PRN
Status: DISCONTINUED | OUTPATIENT
Start: 2024-11-23 | End: 2024-11-24 | Stop reason: HOSPADM

## 2024-11-23 RX ORDER — ACETAMINOPHEN 325 MG/1
650 TABLET ORAL EVERY 4 HOURS PRN
Status: DISCONTINUED | OUTPATIENT
Start: 2024-11-23 | End: 2024-11-24 | Stop reason: HOSPADM

## 2024-11-23 RX ORDER — DEXMEDETOMIDINE HYDROCHLORIDE 4 UG/ML
.1-1.5 INJECTION, SOLUTION INTRAVENOUS CONTINUOUS
Status: DISCONTINUED | OUTPATIENT
Start: 2024-11-23 | End: 2024-11-24 | Stop reason: HOSPADM

## 2024-11-23 RX ORDER — METOCLOPRAMIDE HYDROCHLORIDE 5 MG/ML
10 INJECTION INTRAMUSCULAR; INTRAVENOUS
Status: DISCONTINUED | OUTPATIENT
Start: 2024-11-23 | End: 2024-11-23 | Stop reason: HOSPADM

## 2024-11-23 RX ORDER — OXYCODONE HYDROCHLORIDE 5 MG/1
5 TABLET ORAL EVERY 4 HOURS PRN
Status: DISCONTINUED | OUTPATIENT
Start: 2024-11-23 | End: 2024-11-24 | Stop reason: HOSPADM

## 2024-11-23 RX ORDER — MORPHINE SULFATE 4 MG/ML
4 INJECTION, SOLUTION INTRAMUSCULAR; INTRAVENOUS
Status: DISCONTINUED | OUTPATIENT
Start: 2024-11-23 | End: 2024-11-24 | Stop reason: HOSPADM

## 2024-11-23 RX ORDER — MORPHINE SULFATE 2 MG/ML
2 INJECTION, SOLUTION INTRAMUSCULAR; INTRAVENOUS
Status: DISCONTINUED | OUTPATIENT
Start: 2024-11-23 | End: 2024-11-24 | Stop reason: HOSPADM

## 2024-11-23 RX ORDER — OXYCODONE HYDROCHLORIDE 10 MG/1
10 TABLET ORAL EVERY 4 HOURS PRN
Status: DISCONTINUED | OUTPATIENT
Start: 2024-11-23 | End: 2024-11-24 | Stop reason: HOSPADM

## 2024-11-23 RX ORDER — SODIUM CHLORIDE 0.9 % (FLUSH) 0.9 %
5-40 SYRINGE (ML) INJECTION PRN
Status: DISCONTINUED | OUTPATIENT
Start: 2024-11-23 | End: 2024-11-24 | Stop reason: HOSPADM

## 2024-11-23 RX ORDER — FENTANYL CITRATE 50 UG/ML
INJECTION, SOLUTION INTRAMUSCULAR; INTRAVENOUS
Status: DISCONTINUED | OUTPATIENT
Start: 2024-11-23 | End: 2024-11-23 | Stop reason: SDUPTHER

## 2024-11-23 RX ORDER — ALBUMIN HUMAN 50 G/1000ML
SOLUTION INTRAVENOUS
Status: DISCONTINUED | OUTPATIENT
Start: 2024-11-23 | End: 2024-11-23 | Stop reason: SDUPTHER

## 2024-11-23 RX ORDER — WARFARIN SODIUM 5 MG/1
2.5 TABLET ORAL DAILY
Status: DISCONTINUED | OUTPATIENT
Start: 2024-11-23 | End: 2024-11-23

## 2024-11-23 RX ORDER — HYDROMORPHONE HYDROCHLORIDE 1 MG/ML
0.25 INJECTION, SOLUTION INTRAMUSCULAR; INTRAVENOUS; SUBCUTANEOUS EVERY 5 MIN PRN
Status: DISCONTINUED | OUTPATIENT
Start: 2024-11-23 | End: 2024-11-23 | Stop reason: HOSPADM

## 2024-11-23 RX ORDER — MILRINONE LACTATE 1 MG/ML
50 INJECTION, SOLUTION INTRAVENOUS ONCE
Status: DISCONTINUED | OUTPATIENT
Start: 2024-11-23 | End: 2024-11-24 | Stop reason: HOSPADM

## 2024-11-23 RX ORDER — SODIUM CHLORIDE 0.9 % (FLUSH) 0.9 %
5-40 SYRINGE (ML) INJECTION EVERY 12 HOURS SCHEDULED
Status: DISCONTINUED | OUTPATIENT
Start: 2024-11-23 | End: 2024-11-24 | Stop reason: HOSPADM

## 2024-11-23 RX ORDER — SENNA AND DOCUSATE SODIUM 50; 8.6 MG/1; MG/1
1 TABLET, FILM COATED ORAL 2 TIMES DAILY
Status: DISCONTINUED | OUTPATIENT
Start: 2024-11-23 | End: 2024-11-24 | Stop reason: HOSPADM

## 2024-11-23 RX ORDER — ONDANSETRON 4 MG/1
4 TABLET, ORALLY DISINTEGRATING ORAL EVERY 8 HOURS PRN
Status: DISCONTINUED | OUTPATIENT
Start: 2024-11-23 | End: 2024-11-24 | Stop reason: HOSPADM

## 2024-11-23 RX ORDER — ONDANSETRON 2 MG/ML
INJECTION INTRAMUSCULAR; INTRAVENOUS
Status: DISCONTINUED | OUTPATIENT
Start: 2024-11-23 | End: 2024-11-23 | Stop reason: SDUPTHER

## 2024-11-23 RX ORDER — HEPARIN SODIUM 1000 [USP'U]/ML
INJECTION, SOLUTION INTRAVENOUS; SUBCUTANEOUS
Status: DISCONTINUED | OUTPATIENT
Start: 2024-11-23 | End: 2024-11-23 | Stop reason: SDUPTHER

## 2024-11-23 RX ORDER — SODIUM CHLORIDE 0.9 % (FLUSH) 0.9 %
5-40 SYRINGE (ML) INJECTION EVERY 12 HOURS SCHEDULED
Status: DISCONTINUED | OUTPATIENT
Start: 2024-11-23 | End: 2024-11-23 | Stop reason: HOSPADM

## 2024-11-23 RX ORDER — SODIUM CHLORIDE, SODIUM LACTATE, POTASSIUM CHLORIDE, CALCIUM CHLORIDE 600; 310; 30; 20 MG/100ML; MG/100ML; MG/100ML; MG/100ML
INJECTION, SOLUTION INTRAVENOUS
Status: DISCONTINUED | OUTPATIENT
Start: 2024-11-23 | End: 2024-11-23 | Stop reason: SDUPTHER

## 2024-11-23 RX ORDER — METOPROLOL TARTRATE 1 MG/ML
INJECTION, SOLUTION INTRAVENOUS
Status: DISCONTINUED | OUTPATIENT
Start: 2024-11-23 | End: 2024-11-23 | Stop reason: SDUPTHER

## 2024-11-23 RX ORDER — NALOXONE HYDROCHLORIDE 0.4 MG/ML
INJECTION, SOLUTION INTRAMUSCULAR; INTRAVENOUS; SUBCUTANEOUS PRN
Status: DISCONTINUED | OUTPATIENT
Start: 2024-11-23 | End: 2024-11-23 | Stop reason: HOSPADM

## 2024-11-23 RX ORDER — PROPOFOL 10 MG/ML
INJECTION, EMULSION INTRAVENOUS
Status: DISCONTINUED | OUTPATIENT
Start: 2024-11-23 | End: 2024-11-23 | Stop reason: SDUPTHER

## 2024-11-23 RX ORDER — MEPERIDINE HYDROCHLORIDE 25 MG/ML
12.5 INJECTION INTRAMUSCULAR; INTRAVENOUS; SUBCUTANEOUS EVERY 5 MIN PRN
Status: DISCONTINUED | OUTPATIENT
Start: 2024-11-23 | End: 2024-11-23 | Stop reason: HOSPADM

## 2024-11-23 RX ORDER — SODIUM CHLORIDE 9 MG/ML
INJECTION, SOLUTION INTRAVENOUS CONTINUOUS
Status: DISCONTINUED | OUTPATIENT
Start: 2024-11-23 | End: 2024-11-24

## 2024-11-23 RX ORDER — ROCURONIUM BROMIDE 10 MG/ML
INJECTION, SOLUTION INTRAVENOUS
Status: DISCONTINUED | OUTPATIENT
Start: 2024-11-23 | End: 2024-11-23 | Stop reason: SDUPTHER

## 2024-11-23 RX ORDER — SODIUM CHLORIDE, SODIUM GLUCONATE, SODIUM ACETATE, POTASSIUM CHLORIDE AND MAGNESIUM CHLORIDE 526; 502; 368; 37; 30 MG/100ML; MG/100ML; MG/100ML; MG/100ML; MG/100ML
INJECTION, SOLUTION INTRAVENOUS
Status: DISCONTINUED | OUTPATIENT
Start: 2024-11-23 | End: 2024-11-23 | Stop reason: SDUPTHER

## 2024-11-23 RX ORDER — CALCIUM CHLORIDE 100 MG/ML
INJECTION INTRAVENOUS; INTRAVENTRICULAR
Status: DISCONTINUED | OUTPATIENT
Start: 2024-11-23 | End: 2024-11-23 | Stop reason: SDUPTHER

## 2024-11-23 RX ORDER — WARFARIN SODIUM 2.5 MG/1
2.5 TABLET ORAL DAILY
Status: DISCONTINUED | OUTPATIENT
Start: 2024-11-23 | End: 2024-11-24

## 2024-11-23 RX ORDER — NOREPINEPHRINE BITARTRATE 0.06 MG/ML
1-100 INJECTION, SOLUTION INTRAVENOUS CONTINUOUS
Status: DISCONTINUED | OUTPATIENT
Start: 2024-11-23 | End: 2024-11-24 | Stop reason: HOSPADM

## 2024-11-23 RX ORDER — POLYETHYLENE GLYCOL 3350 17 G/17G
17 POWDER, FOR SOLUTION ORAL DAILY PRN
Status: DISCONTINUED | OUTPATIENT
Start: 2024-11-23 | End: 2024-11-24 | Stop reason: HOSPADM

## 2024-11-23 RX ORDER — BUPIVACAINE HYDROCHLORIDE 2.5 MG/ML
INJECTION, SOLUTION INFILTRATION; PERINEURAL PRN
Status: DISCONTINUED | OUTPATIENT
Start: 2024-11-23 | End: 2024-11-23 | Stop reason: HOSPADM

## 2024-11-23 RX ORDER — DEXTROSE MONOHYDRATE 100 MG/ML
INJECTION, SOLUTION INTRAVENOUS
Status: COMPLETED
Start: 2024-11-23 | End: 2024-11-23

## 2024-11-23 RX ORDER — SODIUM CHLORIDE 0.9 % (FLUSH) 0.9 %
5-40 SYRINGE (ML) INJECTION PRN
Status: DISCONTINUED | OUTPATIENT
Start: 2024-11-23 | End: 2024-11-23 | Stop reason: HOSPADM

## 2024-11-23 RX ORDER — DEXTROSE MONOHYDRATE 100 MG/ML
INJECTION, SOLUTION INTRAVENOUS CONTINUOUS PRN
Status: DISCONTINUED | OUTPATIENT
Start: 2024-11-23 | End: 2024-11-24 | Stop reason: HOSPADM

## 2024-11-23 RX ADMIN — Medication 45 MCG/MIN: at 19:30

## 2024-11-23 RX ADMIN — MILRINONE LACTATE IN DEXTROSE 0.38 MCG/KG/MIN: 200 INJECTION, SOLUTION INTRAVENOUS at 16:26

## 2024-11-23 RX ADMIN — SODIUM CHLORIDE: 9 INJECTION, SOLUTION INTRAVENOUS at 08:12

## 2024-11-23 RX ADMIN — DEXTROSE MONOHYDRATE: 100 INJECTION, SOLUTION INTRAVENOUS at 23:11

## 2024-11-23 RX ADMIN — ROCURONIUM BROMIDE 50 MG: 10 INJECTION, SOLUTION INTRAVENOUS at 10:18

## 2024-11-23 RX ADMIN — PROTAMINE SULFATE 100 MG: 10 INJECTION, SOLUTION INTRAVENOUS at 17:23

## 2024-11-23 RX ADMIN — HEPARIN SODIUM 20000 UNITS: 1000 INJECTION, SOLUTION INTRAVENOUS; SUBCUTANEOUS at 15:55

## 2024-11-23 RX ADMIN — CEFAZOLIN 1 G: 1 INJECTION, POWDER, FOR SOLUTION INTRAMUSCULAR; INTRAVENOUS at 10:34

## 2024-11-23 RX ADMIN — ALBUMIN (HUMAN) 12.5 G: 12.5 INJECTION, SOLUTION INTRAVENOUS at 10:32

## 2024-11-23 RX ADMIN — EPINEPHRINE 20 MCG/MIN: 1 INJECTION INTRAMUSCULAR; INTRAVENOUS; SUBCUTANEOUS at 22:00

## 2024-11-23 RX ADMIN — EPINEPHRINE 0.5 MG: 1 INJECTION, SOLUTION, CONCENTRATE INTRAVENOUS at 17:16

## 2024-11-23 RX ADMIN — CEFAZOLIN 1 G: 1 INJECTION, POWDER, FOR SOLUTION INTRAMUSCULAR; INTRAVENOUS at 15:37

## 2024-11-23 RX ADMIN — METOPROLOL TARTRATE 2.5 MG: 1 INJECTION, SOLUTION INTRAVENOUS at 10:10

## 2024-11-23 RX ADMIN — ACETAMINOPHEN 1000 MG: 500 TABLET ORAL at 08:09

## 2024-11-23 RX ADMIN — CALCIUM CHLORIDE 0.5 G: 100 INJECTION, SOLUTION INTRAVENOUS at 10:25

## 2024-11-23 RX ADMIN — FENTANYL CITRATE 50 MCG: 0.05 INJECTION, SOLUTION INTRAMUSCULAR; INTRAVENOUS at 17:55

## 2024-11-23 RX ADMIN — EPINEPHRINE 4 MCG/KG/MIN: 1 INJECTION INTRAMUSCULAR; INTRAVENOUS; SUBCUTANEOUS at 15:19

## 2024-11-23 RX ADMIN — VASOPRESSIN 0.06 UNITS/MIN: 20 INJECTION INTRAVENOUS at 22:07

## 2024-11-23 RX ADMIN — SODIUM CHLORIDE, SODIUM GLUCONATE, SODIUM ACETATE, POTASSIUM CHLORIDE AND MAGNESIUM CHLORIDE: 526; 502; 368; 37; 30 INJECTION, SOLUTION INTRAVENOUS at 17:23

## 2024-11-23 RX ADMIN — DEXMEDETOMIDINE HYDROCHLORIDE 0.2 MCG/KG/HR: 400 INJECTION, SOLUTION INTRAVENOUS at 22:13

## 2024-11-23 RX ADMIN — PHENYLEPHRINE HYDROCHLORIDE 150 MCG/MIN: 10 INJECTION INTRAVENOUS at 10:15

## 2024-11-23 RX ADMIN — PHYTONADIONE 10 MG: 10 INJECTION, EMULSION INTRAMUSCULAR; INTRAVENOUS; SUBCUTANEOUS at 17:27

## 2024-11-23 RX ADMIN — ONDANSETRON 4 MG: 2 INJECTION INTRAMUSCULAR; INTRAVENOUS at 10:49

## 2024-11-23 RX ADMIN — WATER 2000 MG: 1 INJECTION INTRAMUSCULAR; INTRAVENOUS; SUBCUTANEOUS at 23:25

## 2024-11-23 RX ADMIN — FENTANYL CITRATE 25 MCG: 0.05 INJECTION, SOLUTION INTRAMUSCULAR; INTRAVENOUS at 17:17

## 2024-11-23 RX ADMIN — DEXTROSE MONOHYDRATE 25 G: 25 INJECTION, SOLUTION INTRAVENOUS at 20:46

## 2024-11-23 RX ADMIN — NOREPINEPHRINE BITARTRATE 35 MCG/KG/MIN: 1 INJECTION, SOLUTION, CONCENTRATE INTRAVENOUS at 15:18

## 2024-11-23 RX ADMIN — SODIUM CHLORIDE, SODIUM GLUCONATE, SODIUM ACETATE, POTASSIUM CHLORIDE AND MAGNESIUM CHLORIDE: 526; 502; 368; 37; 30 INJECTION, SOLUTION INTRAVENOUS at 15:30

## 2024-11-23 RX ADMIN — SUGAMMADEX 150 MG: 100 INJECTION, SOLUTION INTRAVENOUS at 11:47

## 2024-11-23 RX ADMIN — ROCURONIUM BROMIDE 50 MG: 10 INJECTION, SOLUTION INTRAVENOUS at 16:06

## 2024-11-23 RX ADMIN — EPINEPHRINE 0.3 MG: 1 INJECTION, SOLUTION, CONCENTRATE INTRAVENOUS at 17:51

## 2024-11-23 RX ADMIN — FENTANYL CITRATE 25 MCG: 0.05 INJECTION, SOLUTION INTRAMUSCULAR; INTRAVENOUS at 11:52

## 2024-11-23 RX ADMIN — SODIUM CHLORIDE, SODIUM LACTATE, POTASSIUM CHLORIDE, AND CALCIUM CHLORIDE: 600; 310; 30; 20 INJECTION, SOLUTION INTRAVENOUS at 15:18

## 2024-11-23 RX ADMIN — EPINEPHRINE 0.2 MG: 1 INJECTION, SOLUTION, CONCENTRATE INTRAVENOUS at 18:01

## 2024-11-23 RX ADMIN — PHENYLEPHRINE HYDROCHLORIDE 100 MCG: 10 INJECTION INTRAVENOUS at 10:05

## 2024-11-23 RX ADMIN — LIDOCAINE HYDROCHLORIDE 50 MG: 10 INJECTION, SOLUTION INFILTRATION; PERINEURAL at 10:16

## 2024-11-23 RX ADMIN — FENTANYL CITRATE 25 MCG: 0.05 INJECTION, SOLUTION INTRAMUSCULAR; INTRAVENOUS at 15:53

## 2024-11-23 RX ADMIN — VASOPRESSIN 0.06 UNITS/MIN: 20 INJECTION INTRAVENOUS at 16:21

## 2024-11-23 RX ADMIN — FENTANYL CITRATE 50 MCG: 0.05 INJECTION, SOLUTION INTRAMUSCULAR; INTRAVENOUS at 10:42

## 2024-11-23 RX ADMIN — SODIUM CHLORIDE, SODIUM LACTATE, POTASSIUM CHLORIDE, AND CALCIUM CHLORIDE: 600; 310; 30; 20 INJECTION, SOLUTION INTRAVENOUS at 10:24

## 2024-11-23 RX ADMIN — PHENYLEPHRINE HYDROCHLORIDE 100 MCG: 10 INJECTION INTRAVENOUS at 10:09

## 2024-11-23 RX ADMIN — FENTANYL CITRATE 25 MCG: 0.05 INJECTION, SOLUTION INTRAMUSCULAR; INTRAVENOUS at 11:56

## 2024-11-23 RX ADMIN — CALCIUM CHLORIDE 0.5 G: 100 INJECTION, SOLUTION INTRAVENOUS at 10:51

## 2024-11-23 RX ADMIN — SODIUM CHLORIDE, SODIUM LACTATE, POTASSIUM CHLORIDE, AND CALCIUM CHLORIDE: 600; 310; 30; 20 INJECTION, SOLUTION INTRAVENOUS at 09:30

## 2024-11-23 RX ADMIN — PROPOFOL 60 MG: 10 INJECTION, EMULSION INTRAVENOUS at 10:17

## 2024-11-23 ASSESSMENT — PULMONARY FUNCTION TESTS
PIF_VALUE: 19
PIF_VALUE: 17
PIF_VALUE: 15
PIF_VALUE: 15
PIF_VALUE: 19
PIF_VALUE: 16
PIF_VALUE: 18
PIF_VALUE: 17
PIF_VALUE: 16
PIF_VALUE: 17

## 2024-11-23 ASSESSMENT — ENCOUNTER SYMPTOMS
SHORTNESS OF BREATH: 1
SHORTNESS OF BREATH: 1

## 2024-11-23 NOTE — H&P
[facility]      Hospitalist - History & Physical      PCP: Cholo Bonilla MD    Date of Admission: 11/22/2024    Date of Service: 11/22/2024    Chief Complaint: Fall with left hip pain    History Of Present Illness:   The patient is a 89 y.o. female who presented to [facility] ED for evaluation of a fall with left hip pain. Pt has history of copd, atrial fibrillation on anticoagulation and hypothyroidism.    Pt tells me that she lives alone in an apartment having fallen getting out of a recliner at home. She denies other injury. She estimates that she fell a short time prior to being brought to ED. She denies fevers as well as recent illness.    In ED, orthopedic surgeon consulted. Imaging of left hip/pelvis with proximal left femur fracture, cxr-cardiomegaly without vascular congestion, INR 2.68, troponin 47, creatinine 0.6/bun 18, glucose 104, wbc 7.6k, hgb 10.5. Pt is admitted inpatient to hospitalist.    Past Medical History:        Diagnosis Date    A-fib ([facility])     COPD (chronic obstructive pulmonary disease) ([facility])     Heart murmur     Hypertension        Past Surgical History:        Procedure Laterality Date    HYSTERECTOMY (CERVIX STATUS UNKNOWN)      TUBAL LIGATION         Home Medications:  Prior to Admission medications    Medication Sig Start Date End Date Taking? Authorizing Provider   levothyroxine (SYNTHROID) 100 MCG tablet Take 1 tablet by mouth Daily   Yes Anabell Clark MD   warfarin (COUMADIN) 2.5 MG tablet Take 1 tablet by mouth   Yes ProviderAnabell MD   furosemide (LASIX) 20 MG tablet Take 1 tablet by mouth 2 times daily   Yes Anabell Clark MD   lisinopril (PRINIVIL;ZESTRIL) 20 MG tablet Take 1 tablet by mouth daily   Yes Anabell Clark MD   dilTIAZem (CARDIZEM) 60 MG tablet Take 1 tablet by mouth in the morning and at bedtime   Yes Anabell Clark MD   propranolol (INDERAL LA) 60 MG extended release capsule Take 1 capsule by mouth daily   Yes

## 2024-11-23 NOTE — ANESTHESIA PROCEDURE NOTES
Central Venous Line:    A central venous line was placed using ultrasound guidance, in the OR for the following indication(s): central venous access and CVP monitoring.11/23/2024 3:37 PM11/23/2024 3:43 PM    Sterility preparation included the following: provider used sterile gloves, gown, hat and mask, hand hygiene performed prior to central venous catheter insertion, sterile gel and probe cover used in ultrasound-guided central venous catheter insertion and maximum sterile barriers used during central venous catheter insertion.    The patient was placed in Trendelenburg position.The right internal jugular vein was prepped.    The site was prepped with Chloraprep.  A 9 Fr (size), 10 (length), introducer single lumen was placed.    During the procedure, the following specific steps were taken: target vein identified, needle advanced into vein and blood aspirated and guidewire advanced into vein.    Intravenous verification was obtained by ultrasound and venous blood return.    Post insertion care included: all ports aspirated, all ports flushed easily, guidewire removed intact, Biopatch applied, line sutured in place and dressing applied.    During the procedure the patient experienced: patient tolerated procedure well with no complications and EBL < 5mL.      Outcomes: uncomplicated and patient tolerated procedure wellNo  Anesthesia type: general..No  Staffing  Performed: Anesthesiologist   Anesthesiologist: Chayito Bah MD  Performed by: Chayito Bah MD  Authorized by: Chayito Bah MD    Preanesthetic Checklist  Completed: patient identified, IV checked, site marked, risks and benefits discussed, surgical/procedural consents, equipment checked, pre-op evaluation, timeout performed, anesthesia consent given, oxygen available, monitors applied/VS acknowledged, fire risk safety assessment completed and verbalized and blood product R/B/A discussed and consented

## 2024-11-23 NOTE — ANESTHESIA POSTPROCEDURE EVALUATION
Department of Anesthesiology  Postprocedure Note    Patient: Barbara Mason  MRN: [id number]  YOB: 1935  Date of evaluation: 11/23/2024    Procedure Summary       Date: 11/23/24 Room / Location: [address]    Anesthesia Start: 1002 Anesthesia Stop: 1430    Procedure: Left long TFN (Left) Diagnosis:       Closed fracture of left femur, unspecified fracture morphology, unspecified portion of femur, initial encounter ([facility])      (Closed fracture of left femur, unspecified fracture morphology, unspecified portion of femur, initial encounter ([facility]) [S72.92XA])    Surgeons: Mu Gordon MD Responsible Provider: Corby Burroughs APRN - CRNA    Anesthesia Type: general ASA Status: 3            Anesthesia Type: No value filed.    Vonda Phase I: Vonda Score: 4    Vonda Phase II:      Anesthesia Post Evaluation    Patient location during evaluation: ICU  Patient participation: complete - patient participated  Level of consciousness: awake and alert  Pain score: 0  Airway patency: patent  Nausea & Vomiting: no nausea and no vomiting  Cardiovascular status: hypotensive, hemodynamically unstable and vasoactive/inotropes  Respiratory status: spontaneous ventilation and CPAP  Hydration status: hypervolemic  Pain management: adequate    No notable events documented.

## 2024-11-23 NOTE — ED PROVIDER NOTES
rate and regular rhythm.      Heart sounds: Normal heart sounds.   Pulmonary:      Effort: Pulmonary effort is normal. No respiratory distress.      Breath sounds: Normal breath sounds. No wheezing or rales.   Abdominal:      General: Bowel sounds are normal.      Palpations: Abdomen is soft. There is no mass.      Tenderness: There is no abdominal tenderness. There is no guarding or rebound.   Musculoskeletal:         General: No tenderness. Normal range of motion.      Cervical back: Normal range of motion and neck supple.      Comments: Left lower extremity shortened and externally rotated with pain in her left hip   Skin:     General: Skin is warm and dry.      Capillary Refill: Capillary refill takes less than 2 seconds.   Neurological:      Mental Status: She is alert and oriented to person, place, and time.   Psychiatric:         Behavior: Behavior normal.         Thought Content: Thought content normal.         Judgment: Judgment normal.         DIAGNOSTIC RESULTS     EKG: All EKG's areinterpreted by the Emergency Department Physician who either signs or Co-signs this chart in the absence of a cardiologist.    EKG shows atrial fibrillation with a rate of 118 left ventricular hypertrophy anterior and inferior old infarct prolonged QT interval    RADIOLOGY:  Non-plain film images such as CT, Ultrasound and MRI are read by the radiologist. Plain radiographic images are visualized and preliminarily interpreted bythe emergency physician with the below findings:          CT CERVICAL SPINE WO CONTRAST   Final Result   1.  No fracture.   2.  Normal alignment.   3.  Degenerative changes as described above.   4.  Otherwise unremarkable images of the cervical spine.        All CT scans are performed using dose optimization techniques as appropriate to the performed exam and include    at least one of the following: Automated exposure control, adjustment of the mA and/or kV according to size, and the use of iterative

## 2024-11-23 NOTE — BRIEF OP NOTE
Brief Postoperative Note      Patient: Barbara Mason  YOB: 1935  MRN: [id number]    Date of Procedure: 11/23/2024    Pre-Op Diagnosis Codes:      * Closed fracture of left femur, unspecified fracture morphology, unspecified portion of femur, initial encounter ([facility]) [S72.92XA]    Post-Op Diagnosis:  Displaced oblique comminuted subtrochanteric left femur fracture       Procedure(s):  Left long TFN    Surgeon(s):  Mu Gordon MD    Assistant:  * No surgical staff found *    Anesthesia: General    Estimated Blood Loss (mL): 150 mL    Complications: None    Specimens:   * No specimens in log *    Implants:  Implant Name Type Inv. Item Serial No.  Lot No. LRB No. Used Action   GUIDEWIRE 3.2MM GUIDE WIRE 475MM STERILE - [id number]  GUIDEWIRE 3.2MM GUIDE WIRE 475MM STERILE  [employer] [id number] Left 1 Implanted   NAIL IM L420MM [id number] 130DEG LNG L PROX FEM GRN TI AMANDA - [id number]  NAIL IM L420MM [id number] 130DEG LNG L PROX FEM GRN TI AMANDA  DEPUY [employer] [place] [id number] Left 1 Implanted   CABLE SURG L750MM DIA1.7MM CERCLAGE CO CHROM SMOOTH W/ CRMP - [id number]  CABLE SURG L750MM DIA1.7MM CERCLAGE CO CHROM SMOOTH W/ CRMP  DEPUY SYNTHES [place] [id number] Left 1 Implanted   BLADE IM L85MM DIA10.35MM PROX FEM G TI Self-A-r-T TRACEY FOR - [id number]  BLADE IM L85MM DIA10.35MM PROX FEM G TI [employer] FEN TRACEY FOR  DEPUY SYNTHES [place]-WD  Left 1 Implanted   SCREW BNE LCK 5X46 MM XL25 STRL TUBE RETROGRADE RFNADVANCED - [id number]  SCREW BNE LCK 5X46 MM XL25 STRL TUBE RETROGRADE RFNADVANCED  DEPUY SYN[employer] [place] [id number] Left 1 Implanted   SCREW BNE LCK 5X48 MM STRL TUBE FOR IM NAIL TI RFNADVANCED - [id number]  SCREW BNE LCK 5X48 MM STRL TUBE FOR IM NAIL TI RFNADVANCED  DEPUY SYNTH[employer] [place] [id number] Left 1 Implanted         Drains:   Urinary Catheter 11/22/24 [place] (Active)   $ Urethral catheter insertion Inserted for procedure 11/22/24 2040   Catheter Indications Perioperative use for selected surgical

## 2024-11-23 NOTE — PLAN OF CARE
SUBJECTIVE:    Left proximal femoral displaced fracture just below the level of the lesser trochanter      OBJECTIVE:    /79   Pulse 93   Temp 97.8 °F (36.6 °C)   Resp 19   Ht 1.753 m (5' 9\")   Wt 63.5 kg (140 lb)   SpO2 90%   BMI 20.67 kg/m²       ASSESSMENTS & PLANS:    Traumatic left proximal femoral displaced fracture: just below the level of the lesser trochanter, Initial Encounter  Admit to [facility]  Narcan PRN patient safety   Tylenol 1g PO Q8h for basal pain control  Oxycodone 2.5-5-10mg PO Mild-Moderate-Severe Pain Scale  Morphine 1-2-4mg IV Mild-Moderate-Severe Pain Scale for when NPO or PO med fails  PT/INR and PTT to be added on  Type and Screen to be added on  CBC with Diff and BMP with Mag reflex daily from tomorrow  Ortho consultant already on case  NPO from [place] except sips with meds  [place] Care  Insert [place] for perioperative usage  Strict Is and Os  Daily Weights  Elevate HoB 30-45  Bed Rest  Add-on 25-HO Vitamin D level  Vitamin D 5,000 units PO QDay  TXA on call to OR as per hospital system policy  Cefazolin 2g IV to OR as per hospital system policy  EKG as per age >=65 years for pre-op clearance, IF NOT ALREADY DONE IN ED TONIGHT     Chronic medical problems:  Continue home regimen as indicated     Supportive and Prophylactic Txx:  DVT PPx: SCDs as per system policy for hip Fxx, will HOLD home warfarin and order daily INR checks QAM  GI (PUD) PPx: not indicated  PT: defer to Ortho      Case flagged by ED provider for consultation / anticipated admission  Chart reviewed   Orders entered by me with CPOE  Case d/w NP swing shift hospitlaist in detail

## 2024-11-23 NOTE — ANESTHESIA PROCEDURE NOTES
Arterial Line:    An arterial line was placed using ultrasound guidance, in the post-op for the following indication(s): continuous blood pressure monitoring and blood sampling needed.    A 20 gauge (size), 4.45 cm (length), Arrow (type) catheter was placed, Seldinger technique used, into the left radial artery and 1% lidocaine 0.25 ml, secured by tape and Tegaderm and biopatch.  Anesthesia type: Local  Local infiltration: Injection    Events:  patient tolerated procedure well with no complications and EBL 0mL.11/23/2024 1:00 PM11/23/2024 1:02 PM  Anesthesiologist: Chayito Bah MD  Performed: Anesthesiologist   Preanesthetic Checklist  Completed: patient identified, IV checked, site marked, risks and benefits discussed, surgical/procedural consents, equipment checked, pre-op evaluation, timeout performed, anesthesia consent given, oxygen available, monitors applied/VS acknowledged, fire risk safety assessment completed and verbalized and blood product R/B/A discussed and consented

## 2024-11-23 NOTE — ED NOTES
ED TO INPATIENT SBAR HANDOFF    Patient Name: Barbara Mason   : 1935  89 y.o.   Family/Caregiver Present: No  Code Status Order: No Order    C-SSRS: Risk of Suicide: No Risk  Sitter no  Restraints:         Situation  Chief Complaint:   Chief Complaint   Patient presents with    Fall     Left hip pain with shortening      Patient Diagnosis: Closed displaced fracture of proximal epiphysis of left femur, initial encounter ([facility]) [S72.186A]     Brief Description of Patient's Condition: pt came from home via EMS after falling in her house this afternoon while trying to get to her door, pts left hip is rotated and shortened  Mental Status: oriented and alert  Arrived from: home    Imaging:   CT Head W/O Contrast   Final Result   No acute intracranial abnormality.       Atrophy with chronic microvascular ischemic change and intracranial atherosclerosis.        All CT scans are performed using dose optimization techniques as appropriate to the performed exam and include    at least one of the following: Automated exposure control, adjustment of the mA and/or kV according to size, and the use of iterative reconstruction technique.        ______________________________________    Electronically signed by: YOANA MEJIAS M.D.   Date:     2024   Time:    20:52       XR HIP 2-3 VW W PELVIS LEFT   Final Result   Oblique and overriding displaced fracture of the left proximal femur at and below the level of the lesser trochanter.       No other acute fracture or dislocation.       Atherosclerotic vascular disease.           ______________________________________    Electronically signed by: YOANA MEJIAS M.D.   Date:     2024   Time:    19:11       XR CHEST PORTABLE   Final Result   Impression:       Cardiomegaly without vascular congestion       Small pleural effusions       Chronic obstructive pulmonary disease           ______________________________________    Electronically signed by: GLORY WHITING

## 2024-11-23 NOTE — BRIEF OP NOTE
Brief Postoperative Note      Patient: Barbara Mason  YOB: 1935  MRN: [id number]    Date of Procedure: 11/23/2024    Pre-Op Diagnosis Codes:      * Heart failure, unspecified HF chronicity, unspecified heart failure type (HCC) [I50.9]    Post-Op Diagnosis: Same       Procedure(s):  BRONCHOSCOPY; WITH PERFUSION; MARYLIN; CARDIOPULMONARY BYPASS; LEFT GROIN CUTDOWN; BALLOON PUMP LEFT GROIN    Surgeon(s):  Nate Monson MD    Assistant:  First Assistant: Linda Da Silva RN    Anesthesia: General    Estimated Blood Loss (mL): Minimal    Complications: None    Specimens:   * No specimens in log *    Implants:  Implant Name Type Inv. Item Serial No.  Lot No. LRB No. Used Action   CLIP INT SM TI EZ LD LIG SYS [employer] - [id number]  CLIP INT SM TI EZ LD LIG SYS WECK [employer]  [employer]  N/A 3 Implanted   CLIP LIG M SHAWNA TI HRT SHP WIRE HORZ 6 CLIPS PER PK - [id number]  CLIP LIG M SHAWNA TI HRT SHP WIRE HORZ 6 CLIPS PER PK  [employer]  N/A 4 Implanted         Drains:   Urinary Catheter 11/22/24 [place]aranjo (Active)   $ Urethral catheter insertion Inserted for procedure 11/22/24 2040   Catheter Indications Perioperative use for selected surgical procedures 11/23/24 0803   Site Assessment No urethral drainage 11/23/24 0803   Urine Color Yellow 11/23/24 1335   Urine Appearance Cloudy 11/23/24 1335   Collection Container Standard 11/23/24 0803   Securement Method Securing device (Describe) 11/23/24 0803   Catheter Care  Soap and water 11/22/24 2143   Catheter Best Practices  Drainage tube clipped to bed;Catheter secured to thigh;Tamper seal intact;Bag below bladder;Bag not on floor;Lack of dependent loop in tubing;Drainage bag less than half full 11/23/24 0803   Status Draining;Patent 11/23/24 0803       Findings:  Infection Present At Time Of Surgery (PATOS) (choose all levels that have infection present):  No infection present  Other Findings: good RV decompression while on CPB confirmed by MARYLIN.

## 2024-11-23 NOTE — ANESTHESIA PROCEDURE NOTES
Procedure Performed: MARYLIN       Start Time:  11/23/2024 4:15 PM       End Time:   11/23/2024 4:20 PM    Anesthesia Information      Echocardiogram Comments:       Easy atraumatic placement on 1st attempt    Preanesthesia Checklist:  Patient identified, IV assessed, risks and benefits discussed, monitors and equipment assessed, procedure being performed at surgeon's request and anesthesia consent obtained.    General Procedure Information  Diagnostic Indications for Echo:  assessment of surgical repair, hemodynamic monitoring and assessment of valve function  Location performed:  OR  Intubated  Bite block placed  Heart visualized  Probe Insertion:  Easy  Probe Type:  Mulitplane  Modalities:  2D and color flow mapping

## 2024-11-23 NOTE — CASE COMMUNICATION
Went to discuss the delay in PACU and that we would be transporting pt to ICU and MD would be out to talk to him

## 2024-11-23 NOTE — CONSULTS
Department of Cardiothoracic Surgery    CHIEF COMPLAINT:    Chief Complaint   Patient presents with    Fall     Left hip pain with shortening           HISTORY OF PRESENT ILLNESS:      The patient is a 89 y.o. female with significant past medical history of left femur fracture after a fall. She underwent nailing of the fracture with induction of anesthesia using propofol 50 mg. The patient developed intra-op hypotension with SBP <100 for about 1/3 of the case despite yohana drip and this persisted after transfer to the ICU. I was called by Dr. Bah after the patient stopped responding to levo and was requiring epi boluses to maintain adequate pressure. She was noted to have a lactate of 5 and poor u/o. Left hand was blue but other extremities seemed appropriate. Cardiology was consulted and reviewed a bedside TTE, showing severe RV distension and wide open TR. I discussed with the patient's son the need for a period of CPB in order to help decompress the right heart and see if she will then respond to further resuscitation better afterwards.    Past Medical History:   Diagnosis Date    A-fib ([facility])     COPD (chronic obstructive pulmonary disease) ([facility])     Heart murmur     Hypertension        Social History     Socioeconomic History    Marital status:      Spouse name: Not on file    Number of children: Not on file    Years of education: Not on file    Highest education level: Not on file   Occupational History    Not on file   Tobacco Use    Smoking status: Never    Smokeless tobacco: Never   Substance and Sexual Activity    Alcohol use: Not Currently    Drug use: Never    Sexual activity: Not on file   Other Topics Concern    Not on file   Social History Narrative    Not on file     Social Determinants of Health     Financial Resource Strain: Not on file   Food Insecurity: No Food Insecurity (11/22/2024)    Hunger Vital Sign     Worried About Running Out of Food in the Last Year: Never true     Ran Out of 
3.6 4.0 4.4   CL 97* 101  --   --    CO2 31* 27  --   --    BUN 18 22  --   --    CREATININE 0.6 0.6  --   --    LABGLOM 86 86  --   --    MG 1.8  --   --   --    CALCIUM 8.3* 8.0*  --   --    PHOS 3.4  --   --   --        CK, CKMB, Troponin: @LABRCNT (CKTOTAL:3, CKMB:3, TROPONINI:3)@    Last 3 BNP:          IMAGING:  XR CHEST PORTABLE    Result Date: 11/23/2024  EXAM: CHEST RADIOGRAPH  TECHNIQUE: Single frontal chest radiograph.  HISTORY: Shortness of breath  COMPARISON: 11/22/2024  FINDINGS: Worsening patchy airspace opacities seen in the right lower lobe with bilateral pleural effusions.  The remaining lung fields are clear.  No pneumothorax.  The cardiac silhouette remains enlarged.  Mediastinal silhouette and pulmonary vessels are within normal limits.  Upper abdomen is unremarkable.  No acute bony abnormality.      1.  Worsening patchy airspace changes in the right lower lobe may be secondary to pneumonia. 2.  Small bilateral pleural effusions. 3.  Continued enlarged cardiac silhouette.    ______________________________________ Electronically signed by: ZACH NAILS M.D. Date:     11/23/2024 Time:    13:45     XR FEMUR LEFT (MIN 2 VIEWS)    Result Date: 11/23/2024  Radiology exam is complete. No Radiologist dictation. Please follow up with ordering provider.     CT CERVICAL SPINE WO CONTRAST    Result Date: 11/22/2024  EXAMINATION: CT CERVICAL SPINE WITHOUT CONTRAST  HISTORY: Trauma due to a fall. Neck pain.  TECHNIQUE: Computed tomography (CT) of the cervical spine was performed according to standard protocol without intravenous contrast.2-D coronal and sagittal reformatted images were obtained from the axial source images. Intravenous Contrast: None. CT Dose Reduction Techniques Performed: Yes.  COMPARISON: None.  FINDINGS: Limited Head: See same day report CT brain. Craniocervical Junction: Normal. Atlantoaxial Joint: Atlantodental interval is normal.  Dens is normal.  Lateral masses of C1 are normal in 
patient's anticoagulation, elevated INR, and low platelets, the patient also is at an elevated risk for bleeding as well as postoperative anemia.  The patient conveys her understanding and indicates her willingness to proceed.    Approximately 35 minutes was spent face to face with the patient discussing the current condition. All risks and benefits of treatment options were discussed at great length and all expressed understanding and agreement with medial reasoning.    Mu Gordon MD 11/23/24 9:56 AM

## 2024-11-23 NOTE — CASE COMMUNICATION
Dr. Bah taken to pt son to discuss options, he returned to bedside, spoke with MD, heart team called in per Dr. Bah

## 2024-11-23 NOTE — DISCHARGE INSTRUCTIONS
wait at least 30 minutes before icing again to avoid ice irritation. It may be difficult at first to ice the surgery area due to the amount of dressing, but continue to be diligent with icing.  Your dressings will be taken down at your first post-op appointment.     Range of motion:   For the knee- It is important to keep the knee as straight as possible following surgery. NO PILLOWS UNDER THE KNEE, ONLY under the ankle  For the foot/ankle- range of motion restrictions will be given to you at your first post-op appointment. Until that time, avoid any unnecessary range o f motion.  Physical therapy- Your physical therapy status will be discussed with you at your first post-op appointment.   **Achieving range of motion goals and decreasing swelling/inflammation are the primary focus for the first two (2) weeks following surgery. **    Medications: You will be discharged with the appropriate medications following your surgery. Fill these at the pharmacy and take them as directed on the label.   Possible medications that will be prescribed are below.  You may or may not receive all of these. Occasionally, additional medications may be given with specific instructions.  Percocet/Lortab (oxycodone/hydrocodone with tylenol) - Pain Medication.  Take one tablet every 4-6 hours. DO NOT EXCEED 4,000mg of Tylenol in 24 hours.  **DO NOT MIX WITH ALCOHOL, DRIVE WHILE TAKING, OR TAKE EXTRA TYLENOL*     Zofran - Anti-nausea medication to help prevent nausea and vomiting after surgery.     Coumadin 2.5 mg - Resume your home dose of Coumadin 2.5 mg tablet daily after surgery    **If you are running low on pain medications, please notify us if you need a refill 24-48 hours prior to when you run out, so we can make arrangements to refill the prescription for you if we determine it is necessary**

## 2024-11-23 NOTE — ANESTHESIA PRE PROCEDURE
Department of Anesthesiology  Preprocedure Note       Name:  Barbara Mason   Age:  89 y.o.  :  1935                                          MRN:  [id number]         Date:  2024      Surgeon: Surgeon(s):  Mu Gordon MD    Procedure: Procedure(s):  Left long TFN    Medications prior to admission:   Prior to Admission medications    Medication Sig Start Date End Date Taking? Authorizing Provider   levothyroxine (SYNTHROID) 100 MCG tablet Take 1 tablet by mouth Daily   Yes ProviderAnabell MD   warfarin (COUMADIN) 2.5 MG tablet Take 1 tablet by mouth   Yes Provider, MD Anabell   furosemide (LASIX) 20 MG tablet Take 1 tablet by mouth 2 times daily   Yes ProviderAnabell MD   lisinopril (PRINIVIL;ZESTRIL) 20 MG tablet Take 1 tablet by mouth daily   Yes ProviderAnabell MD   dilTIAZem (CARDIZEM) 60 MG tablet Take 1 tablet by mouth in the morning and at bedtime   Yes ProviderAnabell MD   propranolol (INDERAL LA) 60 MG extended release capsule Take 1 capsule by mouth daily   Yes ProviderAnabell MD       Current medications:    Current Facility-Administered Medications   Medication Dose Route Frequency Provider Last Rate Last Admin   • vitamin D (CHOLECALCIFEROL) capsule 5,000 Units  5,000 Units Oral Daily Nate Burdick MD       • lidocaine (XYLOCAINE) 2 % uro-jet   Topical PRN Nate Burdick MD       • oxyCODONE (ROXICODONE) immediate release tablet 2.5 mg  2.5 mg Oral Q4H PRN Nate Burdick MD       • oxyCODONE (ROXICODONE) immediate release tablet 5 mg  5 mg Oral Q4H PRN Nate Burdick MD       • oxyCODONE HCl (OXY-IR) immediate release tablet 10 mg  10 mg Oral Q4H PRN Nate Burdick MD       • morphine (PF) injection 1 mg  1 mg IntraVENous Q4H PRN Nate Burdick MD       • morphine (PF) injection 2 mg  2 mg IntraVENous Q4H PRN Nate Burdick MD       • morphine sulfate (PF) injection 4 mg  4 mg IntraVENous Q4H PRN Nate Burdick MD       • naloxone (NARCAN) injection

## 2024-11-24 ENCOUNTER — APPOINTMENT (OUTPATIENT)
Dept: GENERAL RADIOLOGY | Age: 89
DRG: 480 | End: 2024-11-24
Payer: MEDICARE

## 2024-11-24 VITALS
OXYGEN SATURATION: 78 % | HEIGHT: 69 IN | BODY MASS INDEX: 20.73 KG/M2 | WEIGHT: 140 LBS | SYSTOLIC BLOOD PRESSURE: 148 MMHG | HEART RATE: 76 BPM | DIASTOLIC BLOOD PRESSURE: 77 MMHG | TEMPERATURE: 95.9 F

## 2024-11-24 PROBLEM — I50.9 MULTIPLE ORGAN FAILURE WITH HEART FAILURE (HCC): Status: ACTIVE | Noted: 2024-11-24

## 2024-11-24 LAB
ALBUMIN SERPL-MCNC: 2.4 G/DL (ref 3.5–5.2)
ALBUMIN SERPL-MCNC: 2.8 G/DL (ref 3.5–5.2)
ALP SERPL-CCNC: 70 U/L (ref 35–104)
ALP SERPL-CCNC: 77 U/L (ref 35–104)
ALT SERPL-CCNC: 211 U/L (ref 5–33)
ALT SERPL-CCNC: 751 U/L (ref 5–33)
ANION GAP SERPL CALCULATED.3IONS-SCNC: 26 MMOL/L (ref 7–19)
ANION GAP SERPL CALCULATED.3IONS-SCNC: 31 MMOL/L (ref 7–19)
AST SERPL-CCNC: 2236 U/L (ref 5–32)
AST SERPL-CCNC: 563 U/L (ref 5–32)
BASE EXCESS ARTERIAL: -17.6 MMOL/L (ref -2–2)
BASE EXCESS VENOUS: -18 MMOL/L
BASOPHILS # BLD: 0 K/UL (ref 0–0.2)
BASOPHILS NFR BLD: 0.2 % (ref 0–1)
BILIRUB SERPL-MCNC: 4.8 MG/DL (ref 0.2–1.2)
BILIRUB SERPL-MCNC: 5.1 MG/DL (ref 0.2–1.2)
BLOOD BANK DISPENSE STATUS: NORMAL
BLOOD BANK PRODUCT CODE: NORMAL
BPU ID: NORMAL
BUN SERPL-MCNC: 25 MG/DL (ref 8–23)
BUN SERPL-MCNC: 25 MG/DL (ref 8–23)
CALCIUM SERPL-MCNC: 7.5 MG/DL (ref 8.8–10.2)
CALCIUM SERPL-MCNC: 7.5 MG/DL (ref 8.8–10.2)
CARBOXYHEMOGLOBIN ARTERIAL: 2.3 % (ref 0–5)
CARBOXYHEMOGLOBIN: 3.4 %
CHLORIDE SERPL-SCNC: 103 MMOL/L (ref 98–111)
CHLORIDE SERPL-SCNC: 104 MMOL/L (ref 98–111)
CO2 SERPL-SCNC: 12 MMOL/L (ref 22–29)
CO2 SERPL-SCNC: 13 MMOL/L (ref 22–29)
CREAT SERPL-MCNC: 1.4 MG/DL (ref 0.5–0.9)
CREAT SERPL-MCNC: 1.5 MG/DL (ref 0.5–0.9)
DESCRIPTION BLOOD BANK: NORMAL
EOSINOPHIL # BLD: 0 K/UL (ref 0–0.6)
EOSINOPHIL NFR BLD: 0 % (ref 0–5)
ERYTHROCYTE [DISTWIDTH] IN BLOOD BY AUTOMATED COUNT: 18.4 % (ref 11.5–14.5)
ERYTHROCYTE [DISTWIDTH] IN BLOOD BY AUTOMATED COUNT: 18.5 % (ref 11.5–14.5)
FIO2: 90 %
GLUCOSE BLD-MCNC: 105 MG/DL (ref 70–99)
GLUCOSE BLD-MCNC: 106 MG/DL (ref 70–99)
GLUCOSE SERPL-MCNC: 142 MG/DL (ref 70–99)
GLUCOSE SERPL-MCNC: 84 MG/DL (ref 70–99)
HCO3 ARTERIAL: 11.3 MMOL/L (ref 22–26)
HCO3 VENOUS: 11 MMOL/L (ref 23–29)
HCT VFR BLD AUTO: 20.1 % (ref 37–47)
HCT VFR BLD AUTO: 23.8 % (ref 37–47)
HCT VFR BLD AUTO: 24.9 % (ref 37–47)
HEMOGLOBIN, ART, EXTENDED: 7.7 G/DL (ref 12–16)
HGB BLD-MCNC: 6.1 G/DL (ref 12–16)
HGB BLD-MCNC: 7.2 G/DL (ref 12–16)
HGB BLD-MCNC: 7.6 G/DL (ref 12–16)
IMM GRANULOCYTES # BLD: 0.1 K/UL
INR PPP: 10 (ref 0.88–1.18)
INR PPP: 3.77 (ref 0.88–1.18)
INR PPP: 7.31 (ref 0.88–1.18)
LACTATE BLDV-SCNC: 17.1 MMOL/L (ref 0.5–1.9)
LACTATE BLDV-SCNC: 20.1 MMOL/L (ref 0.5–1.9)
LYMPHOCYTES # BLD: 0.8 K/UL (ref 1.1–4.5)
LYMPHOCYTES NFR BLD: 7.1 % (ref 20–40)
MAGNESIUM SERPL-MCNC: 1.7 MG/DL (ref 1.6–2.4)
MAGNESIUM SERPL-MCNC: 1.8 MG/DL (ref 1.6–2.4)
MCH RBC QN AUTO: 31.1 PG (ref 27–31)
MCH RBC QN AUTO: 31.6 PG (ref 27–31)
MCHC RBC AUTO-ENTMCNC: 30.3 G/DL (ref 33–37)
MCHC RBC AUTO-ENTMCNC: 30.3 G/DL (ref 33–37)
MCV RBC AUTO: 102.6 FL (ref 81–99)
MCV RBC AUTO: 104.4 FL (ref 81–99)
MECHANICAL RATE IN BPM: 16
METHEMOGLOBIN ARTERIAL: 1.5 %
METHEMOGLOBIN VENOUS: 1.1 %
MODE: ABNORMAL
MONOCYTES # BLD: 1.3 K/UL (ref 0–0.9)
MONOCYTES NFR BLD: 11.1 % (ref 0–10)
NEUTROPHILS # BLD: 9.5 K/UL (ref 1.5–7.5)
NEUTS SEG NFR BLD: 81.1 % (ref 50–65)
O2 CONTENT ARTERIAL: 10.6 ML/DL
O2 CONTENT, VEN: 9 ML/DL
O2 SAT, ARTERIAL: 95.7 %
O2 SAT, VEN: 82 %
O2 THERAPY: ABNORMAL
PCO2 ARTERIAL: 39 MMHG (ref 35–45)
PCO2 VENOUS: 42 MMHG (ref 40–50)
PERFORMED ON: ABNORMAL
PERFORMED ON: ABNORMAL
PH ARTERIAL: 7.07 (ref 7.35–7.45)
PH VENOUS: 7.04 (ref 7.35–7.45)
PLATELET # BLD AUTO: 64 K/UL (ref 130–400)
PLATELET # BLD AUTO: 83 K/UL (ref 130–400)
PMV BLD AUTO: 11 FL (ref 9.4–12.3)
PMV BLD AUTO: 12.3 FL (ref 9.4–12.3)
PO2 ARTERIAL: 101 MMHG (ref 80–100)
PO2 VENOUS: 50 MMHG
POSITIVE END EXP PRESS: 5
POTASSIUM BLD-SCNC: 4.3 MMOL/L
POTASSIUM SERPL-SCNC: 3.9 MMOL/L (ref 3.5–5)
POTASSIUM SERPL-SCNC: 4.5 MMOL/L (ref 3.5–5)
PROT SERPL-MCNC: 3.8 G/DL (ref 6.4–8.3)
PROT SERPL-MCNC: 4.2 G/DL (ref 6.4–8.3)
PROTHROMBIN TIME: 36.4 SEC (ref 12–14.6)
PROTHROMBIN TIME: 61.1 SEC (ref 12–14.6)
PROTHROMBIN TIME: 95.2 SEC (ref 12–14.6)
RBC # BLD AUTO: 1.96 M/UL (ref 4.2–5.4)
RBC # BLD AUTO: 2.28 M/UL (ref 4.2–5.4)
REASON FOR REJECTION: NORMAL
REASON FOR REJECTION: NORMAL
REJECTED TEST: NORMAL
REJECTED TEST: NORMAL
SODIUM SERPL-SCNC: 142 MMOL/L (ref 136–145)
SODIUM SERPL-SCNC: 147 MMOL/L (ref 136–145)
VT MECHANICAL: 400 %
WBC # BLD AUTO: 11.7 K/UL (ref 4.8–10.8)
WBC # BLD AUTO: 13 K/UL (ref 4.8–10.8)

## 2024-11-24 PROCEDURE — 83605 ASSAY OF LACTIC ACID: CPT

## 2024-11-24 PROCEDURE — 85014 HEMATOCRIT: CPT

## 2024-11-24 PROCEDURE — 71045 X-RAY EXAM CHEST 1 VIEW: CPT

## 2024-11-24 PROCEDURE — 85025 COMPLETE CBC W/AUTO DIFF WBC: CPT

## 2024-11-24 PROCEDURE — 80053 COMPREHEN METABOLIC PANEL: CPT

## 2024-11-24 PROCEDURE — 2580000003 HC RX 258: Performed by: ORTHOPAEDIC SURGERY

## 2024-11-24 PROCEDURE — 2500000003 HC RX 250 WO HCPCS: Performed by: STUDENT IN AN ORGANIZED HEALTH CARE EDUCATION/TRAINING PROGRAM

## 2024-11-24 PROCEDURE — 2580000003 HC RX 258: Performed by: SURGERY

## 2024-11-24 PROCEDURE — 85018 HEMOGLOBIN: CPT

## 2024-11-24 PROCEDURE — 94003 VENT MGMT INPAT SUBQ DAY: CPT

## 2024-11-24 PROCEDURE — 82803 BLOOD GASES ANY COMBINATION: CPT

## 2024-11-24 PROCEDURE — 6360000002 HC RX W HCPCS: Performed by: SURGERY

## 2024-11-24 PROCEDURE — 2500000003 HC RX 250 WO HCPCS: Performed by: SURGERY

## 2024-11-24 PROCEDURE — 82800 BLOOD PH: CPT

## 2024-11-24 PROCEDURE — 2500000003 HC RX 250 WO HCPCS: Performed by: INTERNAL MEDICINE

## 2024-11-24 PROCEDURE — 37799 UNLISTED PX VASCULAR SURGERY: CPT

## 2024-11-24 PROCEDURE — 6360000002 HC RX W HCPCS: Performed by: ORTHOPAEDIC SURGERY

## 2024-11-24 PROCEDURE — 82962 GLUCOSE BLOOD TEST: CPT

## 2024-11-24 PROCEDURE — 2700000000 HC OXYGEN THERAPY PER DAY

## 2024-11-24 PROCEDURE — 85027 COMPLETE CBC AUTOMATED: CPT

## 2024-11-24 PROCEDURE — 36430 TRANSFUSION BLD/BLD COMPNT: CPT

## 2024-11-24 PROCEDURE — 83735 ASSAY OF MAGNESIUM: CPT

## 2024-11-24 PROCEDURE — 85610 PROTHROMBIN TIME: CPT

## 2024-11-24 PROCEDURE — 94760 N-INVAS EAR/PLS OXIMETRY 1: CPT

## 2024-11-24 PROCEDURE — 99024 POSTOP FOLLOW-UP VISIT: CPT | Performed by: SURGERY

## 2024-11-24 RX ORDER — SODIUM CHLORIDE 9 MG/ML
INJECTION, SOLUTION INTRAVENOUS PRN
Status: DISCONTINUED | OUTPATIENT
Start: 2024-11-24 | End: 2024-11-24 | Stop reason: HOSPADM

## 2024-11-24 RX ORDER — LORAZEPAM 2 MG/ML
1 INJECTION INTRAMUSCULAR ONCE
Status: DISCONTINUED | OUTPATIENT
Start: 2024-11-24 | End: 2024-11-24 | Stop reason: HOSPADM

## 2024-11-24 RX ORDER — SODIUM CHLORIDE 9 MG/ML
50 INJECTION, SOLUTION INTRAVENOUS ONCE
Status: DISCONTINUED | OUTPATIENT
Start: 2024-11-24 | End: 2024-11-24 | Stop reason: HOSPADM

## 2024-11-24 RX ORDER — FENTANYL CITRATE-0.9 % NACL/PF 10 MCG/ML
25-200 PLASTIC BAG, INJECTION (ML) INTRAVENOUS CONTINUOUS
Status: DISCONTINUED | OUTPATIENT
Start: 2024-11-24 | End: 2024-11-24 | Stop reason: HOSPADM

## 2024-11-24 RX ORDER — CALCIUM GLUCONATE 20 MG/ML
2000 INJECTION, SOLUTION INTRAVENOUS ONCE
Status: COMPLETED | OUTPATIENT
Start: 2024-11-24 | End: 2024-11-24

## 2024-11-24 RX ORDER — SODIUM CHLORIDE 9 MG/ML
INJECTION, SOLUTION INTRAVENOUS PRN
Status: DISCONTINUED | OUTPATIENT
Start: 2024-11-24 | End: 2024-11-24

## 2024-11-24 RX ADMIN — PHENYLEPHRINE HYDROCHLORIDE 300 MCG/MIN: 10 INJECTION INTRAVENOUS at 12:48

## 2024-11-24 RX ADMIN — DEXMEDETOMIDINE HYDROCHLORIDE 1.5 MCG/KG/HR: 400 INJECTION, SOLUTION INTRAVENOUS at 07:53

## 2024-11-24 RX ADMIN — SODIUM BICARBONATE 150 MEQ: 84 INJECTION, SOLUTION INTRAVENOUS at 05:06

## 2024-11-24 RX ADMIN — Medication 25 MCG/HR: at 11:30

## 2024-11-24 RX ADMIN — PROTHROMBIN COMPLEX CONCENTRATE (HUMAN) 1000 UNITS: 25.5; 16.5; 24; 22; 22; 26 POWDER, FOR SOLUTION INTRAVENOUS at 09:50

## 2024-11-24 RX ADMIN — VASOPRESSIN 0.04 UNITS/MIN: 20 INJECTION INTRAVENOUS at 10:38

## 2024-11-24 RX ADMIN — EPINEPHRINE 14 MCG/MIN: 1 INJECTION INTRAMUSCULAR; INTRAVENOUS; SUBCUTANEOUS at 06:45

## 2024-11-24 RX ADMIN — MORPHINE SULFATE 4 MG: 4 INJECTION, SOLUTION INTRAMUSCULAR; INTRAVENOUS at 13:53

## 2024-11-24 RX ADMIN — CALCIUM GLUCONATE 2000 MG: 20 INJECTION, SOLUTION INTRAVENOUS at 08:01

## 2024-11-24 RX ADMIN — SODIUM BICARBONATE: 84 INJECTION, SOLUTION INTRAVENOUS at 05:06

## 2024-11-24 RX ADMIN — DEXMEDETOMIDINE HYDROCHLORIDE 1.5 MCG/KG/HR: 400 INJECTION, SOLUTION INTRAVENOUS at 04:57

## 2024-11-24 RX ADMIN — EPINEPHRINE 20 MCG/MIN: 1 INJECTION INTRAMUSCULAR; INTRAVENOUS; SUBCUTANEOUS at 12:03

## 2024-11-24 RX ADMIN — PHENYLEPHRINE HYDROCHLORIDE 30 MCG/MIN: 10 INJECTION INTRAVENOUS at 08:54

## 2024-11-24 RX ADMIN — MILRINONE LACTATE IN DEXTROSE 0.38 MCG/KG/MIN: 200 INJECTION, SOLUTION INTRAVENOUS at 06:07

## 2024-11-24 RX ADMIN — PHYTONADIONE 10 MG: 10 INJECTION, EMULSION INTRAMUSCULAR; INTRAVENOUS; SUBCUTANEOUS at 06:04

## 2024-11-24 RX ADMIN — Medication 15 MCG/MIN: at 03:36

## 2024-11-24 RX ADMIN — VASOPRESSIN 0.06 UNITS/MIN: 20 INJECTION INTRAVENOUS at 01:07

## 2024-11-24 RX ADMIN — EPINEPHRINE 20 MCG/MIN: 1 INJECTION INTRAMUSCULAR; INTRAVENOUS; SUBCUTANEOUS at 01:50

## 2024-11-24 RX ADMIN — DEXMEDETOMIDINE HYDROCHLORIDE 1.5 MCG/KG/HR: 400 INJECTION, SOLUTION INTRAVENOUS at 12:03

## 2024-11-24 RX ADMIN — SODIUM BICARBONATE 50 MEQ: 84 INJECTION INTRAVENOUS at 08:18

## 2024-11-24 RX ADMIN — DEXTROSE MONOHYDRATE: 100 INJECTION, SOLUTION INTRAVENOUS at 00:58

## 2024-11-24 RX ADMIN — WATER 2000 MG: 1 INJECTION INTRAMUSCULAR; INTRAVENOUS; SUBCUTANEOUS at 07:41

## 2024-11-24 ASSESSMENT — PULMONARY FUNCTION TESTS
PIF_VALUE: 20
PIF_VALUE: 19
PIF_VALUE: 25
PIF_VALUE: 14
PIF_VALUE: 21
PIF_VALUE: 14
PIF_VALUE: 20
PIF_VALUE: 15
PIF_VALUE: 14
PIF_VALUE: 15
PIF_VALUE: 18
PIF_VALUE: 18
PIF_VALUE: 14
PIF_VALUE: 16
PIF_VALUE: 15

## 2024-11-24 ASSESSMENT — PAIN SCALES - GENERAL: PAINLEVEL_OUTOF10: 10

## 2024-11-24 NOTE — OP NOTE
[facility]              [address] [place] [address]                            OPERATIVE REPORT      PATIENT NAME: EAN MCCARTY                 : 1935  MED REC NO: [id number]                          ROOM: [id number]  ACCOUNT NO: [id number]                       ADMIT DATE: 2024  PROVIDER: Nate Monson MD      DATE OF PROCEDURE:  2024    SURGEON:  Nate Monson MD    PREOPERATIVE DIAGNOSIS:  Cardiogenic shock with severe right ventricular distention.    POSTOPERATIVE DIAGNOSIS:  Cardiogenic shock with severe right ventricular distention.    PROCEDURES: Cutdown on the femoral artery and vein to cannulate for cardiopulmonary bypass, placement of a right sided intra-aortic balloon pump, bronchoscopy    ASSISTANT:  Linda Da Silva.    ESTIMATED BLOOD LOSS:  50 mL.    CARDIOPULMONARY BYPASS TIME:  1 hour.    HISTORY:  The patient is an 89-year-old lady with a known history of moderate to severe aortic valve stenosis.  Next week, she has a planned visit to Dr. Rick at [facility] to be evaluated for transcatheter aortic valve implant procedure.  Just today, the patient fell and broke her hip and needed urgent femur repair.  During the surgical procedure, the patient developed hypotension, treated with increasing dose of a vasopressor drip.  On arrival to the ICU, the blood pressure became refractory to vasopressors. She was acidotic, with low urine output and requiring epinephrine boluses.  I was consulted along with Cardiology by Dr. Bah.  A STAT bedside echo showed severe right ventricular dysfunction with poor RV contraction and extreme distention of the right ventricle causing wide-open tricuspid regurg due to severe dilation of the tricuspid anulus.  In addition, the patient had what appeared to be severe aortic valve stenosis with minimal opening of the aortic valve, likely due to poor cardiac output.  In light of this dire clinical status and lack of 
Traction and internal rotation were applied to the operative extremity and the fracture was noted to adequately align. A sterile prep and drape was then performed.     An incision was made over the lateral aspect of the thigh corresponding to the level of the fracture.  A reduction maneuver was performed with traction.  Thereafter, a cerclage cable was advanced around the circumference of the diaphysis at the level of the fracture.  Gentle tensioning was applied to align the oblique fragments.    A guidepin was placed at the tip of the greater trochanter on the AP plane and in line with the intramedullary canal on the lateral view.  The wire was advanced to the level of the lesser trochanter followed by introduction of the starting reamer.  A ball-tip guidewire was inserted to the level of the fracture.  After traction restored alignment to the fracture, the guidewire was advanced traversing the fracture to the central aspect of the femoral condyles in both the AP and lateral planes.  Progressively-sized reamers were introduced until chatter was felt and heard. The nail of choice was measured and found to be 12 x 420 mm.     The nail of choice was then placed into the intramedullary canal.  Utilizing C arm fluoroscopy perfect circles technique, distal interlocking screws were placed from lateral to medial and the fracture was back-slapped proximally compressing the fracture. Utilizing the attachment jig, a guidepin was then placed into the central aspect of the femoral head on both the AP and lateral views.  Length was measured for the spiral blade and the path of the screw was drilled to the appropriate depth.  The spiral blade was placed without complication and the set screw was placed proximally, loosened a 1/4 turn to allow for compression of the fracture.     C-arm images were used in multiple planes showing adequate alignment of the fracture without hardware complication.     Incisions were irrigated, closed

## 2024-11-24 NOTE — PROGRESS NOTES
Daily Progress Note    Date:2024  Patient: Barbara Mason  : 1935  MRN:[id number]  CODE:DNR No additional code details  PCP:Cholo Bonilla MD    Admit Date: 2024  5:43 PM   LOS: 2 days       Subjective: Seen and examined.  On mechanical ventilation, sedated      Summary: 89-year-old female with chronic heart failure admitted on  after presenting following sustaining a fall with left hip pain, unable to bear weight and found to have proximal left femur fracture.  Seen by orthopedic surgery and went to the OR on  underwent cephalomedullary nailing.  Intraoperatively and postoperatively required vasopressor support.  Postprocedure had evidence of shock and cool extremities, remained intubated with acute hypoxic respiratory failure, requiring Levophed infusion and pushes of epinephrine, transferred to ICU, seen in consultation by cardiology and CT surgery.  Stat echocardiogram obtained showed evidence of severe right sided heart failure, severe tricuspid regurgitation, lack of forward flow.  Managed for cardiogenic shock on milrinone infusion, multiple vasopressors and intra-aortic balloon pump placed.  Patient subsequently developed multi organ dysfunction syndrome.      Objective:      Vital signs in last 24 hours:  Patient Vitals for the past 24 hrs:   BP Temp Temp src Pulse Resp SpO2   24 1300 -- (!) 95.9 °F (35.5 °C) Temporal (!) 107 21 96 %   24 1200 136/76 (!) 95.5 °F (35.3 °C) Temporal (!) 118 21 96 %   24 1145 (!) 83/30 (!) 95.5 °F (35.3 °C) -- (!) 105 26 (!) 86 %   24 1100 (!) 141/70 -- -- (!) 116 20 92 %   24 1013 -- -- -- 82 22 94 %   24 1000 132/62 -- -- (!) 103 20 94 %   24 0900 (!) 142/54 (!) 95.5 °F (35.3 °C) Temporal 92 21 95 %   24 0836 (!) 98/40 -- -- -- -- --   24 0800 (!) 101/38 (!) 95.5 °F (35.3 °C) Temporal (!) 104 23 97 %   24 0750 136/76 (!) 95.5 °F (35.3 °C) -- (!) 118 21 96 %   24 0723 (!) 100/35 
4 Eyes Skin Assessment     NAME:  Barbara Mason  YOB: 1935  MEDICAL RECORD NUMBER:  [id number]    The patient is being assessed for  Admission    I agree that at least one RN has performed a thorough Head to Toe Skin Assessment on the patient. ALL assessment sites listed below have been assessed.      Areas assessed by both nurses:    Head, Face, Ears, Shoulders, Back, Chest, Arms, Elbows, Hands, Sacrum. Buttock, Coccyx, Ischium, Legs. Feet and Heels, and Under Medical Devices         Does the Patient have a Wound? No noted wound(s)       Slick Prevention initiated by RN: Yes  Wound Care Orders initiated by RN: No    Pressure Injury (Stage 3,4, Unstageable, DTI, NWPT, and Complex wounds) if present, place Wound referral order by RN under : No    New Ostomies, if present place, Ostomy referral order under : No     Nurse 1 eSignature: Electronically signed by Conner Verma RN on 11/22/24 at 10:31 PM CST    **SHARE this note so that the co-signing nurse can place an eSignature**    Nurse 2 eSignature: Electronically signed by Sara Valdez RN on 11/22/24 at 10:34 PM CST    
Cardiology Progress Note Deven Vargas MD      Patient:  Barbara Mason  [id number]    Patient Active Problem List    Diagnosis Date Noted    Intertrochanteric fracture of left femur, closed, initial encounter ([facility]) 11/23/2024     Priority: High    Acute right-sided heart failure (HCC) 11/23/2024     Priority: High    Cardiogenic shock 11/23/2024     Priority: High    Nonrheumatic aortic valve stenosis 11/23/2024     Priority: High    Atrial fibrillation (HCC) 11/23/2024     Priority: High    Multiple organ failure with heart failure (HCC) 11/24/2024     Priority: Low    Closed displaced fracture of proximal epiphysis of left femur, initial encounter ([facility]) 11/22/2024     Priority: Low       Admit Date:  11/22/2024    Admission Problem List: Present on Admission:   Closed displaced fracture of proximal epiphysis of left femur, initial encounter ([facility])   Intertrochanteric fracture of left femur, closed, initial encounter ([facility])   Acute right-sided heart failure (HCC)   Cardiogenic shock   Nonrheumatic aortic valve stenosis   Atrial fibrillation (HCC)   Multiple organ failure with heart failure (HCC)      Cardiac Specific Data:  Specialty Problems          Cardiology Problems    Acute right-sided heart failure (HCC)        Atrial fibrillation (HCC)        * (Principal) Cardiogenic shock        Nonrheumatic aortic valve stenosis        Multiple organ failure with heart failure (HCC)         1.  Moderate to severe aortic stenosis (by echo 1/2024 reported at [facility]), reported normal EF.  2.  Severe tricuspid regurgitation.  3.  Chronic atrial fibrillation with prior unsuccessful ablation, on Coumadin.  4.  COPD.  5.  Fall with left femoral fracture status post ORIF 11/23/2024 with postoperative shock state, evidence of severe right-sided chamber dilatation with severe wide-open TR with underfilled left ventricle, brief CPS and surgical IABP placement 11/23/2024.    Subjective:  Ms. Mason had undergone previous EPS as well as IABP 
Consent signed and placed on soft chart for cardiopulmonary bypass   
Extubated pt per drs orders  
Family at bedside. Daughter (Klarissa) and son (Pravin) would like one more granddaughter to be able to see her before they withdrawal care. Dr. Monson and Dr. Townsend are aware. Patient remains DNR.   
Family updated on procedure and are at bedside.   
IABP turned off per Dr. Monson. Patient terminally extubated to room air. Family at bedside.  
Increased PAP pressure from 10 to 14 after ABG  
Marisela from hematology notified this nurse of postop critical hemoglobin 8.2. This nurse called PACU and spoke to LINA Grover caring for patient to relay critical result information, RN states they are already aware and pt is receiving blood at this time. Electronically signed by Mandy Burks RN on 11/23/2024 at 1:12 PM  
Melissa and Lurdes just picked up patient on behalf of  [facility]  [facility]. Provisional of Death and Face sheet given to  Home.  
OR staff at bedside to take patient to OR. Patient remains on CPAP, pressure of 5 with 15L bled in.  
Off bypass  
On bypass  
Patient arrived from OR with Anesthesia at bedside. Patient placed on vent. BP 90s/60s Maxed on EPI and Vaso. Levo at 45mcg, Milirone at 0.375.  
Patient brought to ICU emergently from PACU. BP 70s/40s upon arrival. Levo gtt, Epi pushes by anesthesia. Dr. Monson, Dr. Vargas and Dr. Bah at bedside. L hand was turning blue. L ART line and L hand IV removed. 2+ palpable pulse  Left radial pulse. R femoral A line placed by Dr. Monson and Left femoral central line placed by Dr. Monson. STAT bedside ECHO completed.   
Placed pt on cpap 5 15L per drs orders  
Placed pt on vent per drs orders vt400 rr 16 peep 5 100% size 7.5 20@ lip  
Pt's belongings gathered and taken to ICU rm 144. Face-to-face report given to Racheal Recinos RN. Electronically signed by Mandy Burks RN on 11/23/2024 at 1:47 PM  
Silvia with surgery transport came to get patient at 0930. This nurse called surgery desk and spoke with LINA Arevalo about pt concerns. Pt blood pressure is 90/48 manually, heart rate in the 70s, did not administer beta blocker nor pain medications due to low BP, attempted to reach Hospitalist to ask if they wanted to give a fluid bolus prior to sx with no response, consents were printed but have not been signed due to patient has not talked to surgeon yet, sent with pt chart. Electronically signed by Mandy Burks RN on 11/23/2024 at 9:46 AM  
Xray notified for stat xray  
[I.V.:4375.5; IV Piggyback:350]  Out: 478 [Urine:168; Emesis/NG output:160; Blood:150]      I/O last 3 completed shifts:  In: 4725.5 [I.V.:4375.5; IV Piggyback:350]  Out: 478 [Urine:168; Emesis/NG output:160; Blood:150]  Scheduled Meds:    calcium gluconate  2,000 mg IntraVENous Once    prothrombin complex human-lans  1,000 Units IntraVENous Once    sodium chloride flush  5-40 mL IntraVENous 2 times per day    sennosides-docusate sodium  1 tablet Oral BID    milrinone  50 mcg/kg IntraVENous Once    warfarin placeholder: dosing by provider   Other RX Placeholder    warfarin  2.5 mg Oral Daily    vitamin D  5,000 Units Oral Daily    acetaminophen  1,000 mg Oral 3 times per day    dilTIAZem  60 mg Oral BID    furosemide  20 mg Oral BID    levothyroxine  100 mcg Oral QAM AC    propranolol  60 mg Oral Daily    [Held by provider] lisinopril  20 mg Oral Daily    sodium chloride flush  5-40 mL IntraVENous 2 times per day     Continuous Infusions:    sodium bicarbonate 150 mEq in dextrose 5 % 1,000 mL infusion 100 mL/hr at 11/24/24 0506    sodium chloride      sodium chloride      phenylephrine 30 mcg/min (11/24/24 0854)    sodium chloride      sodium chloride      sodium chloride      sodium chloride      EPINEPHrine 15 mcg/min (11/24/24 0742)    milrinone 0.375 mcg/kg/min (11/24/24 0607)    VASOpressin 20 Units in sodium chloride 0.9 % 100 mL infusion 0.04 Units/min (11/24/24 0349)    dexmedeTOMIDine HCl in NaCl 1.5 mcg/kg/hr (11/24/24 0753)    sodium chloride      norepinephrine 10 mcg/min (11/24/24 0342)    dextrose 100 mL/hr at 11/24/24 0058    sodium chloride 150 mL/hr at 11/23/24 0812     Exam:   General appearance: on vent  Neck: no bruits, no JVD, No lymph nodes   Lungs: no rhonchi, no wheezes, no rales   Heart: S1 and S2 no murmer, + rub  Abdomen: positive bowel sounds, no bruits, no masses  Extremities: warm and dry, no cyanosis, no clubbing  Groin Wound:  Dressing dry and intact    Assessment  SP IABP placement, 
controlled, but based on evaluation and chart review currently, that is not likely  5: Toe touch weight bearing operative LL extremity    Electronically signed by Mu Gordon MD on 11/24/2024 at 1:02 PM        
immediate release tablet 2.5 mg, 2.5 mg, Oral, Q4H PRN, Nate Burdick MD    [Transfer Hold] oxyCODONE (ROXICODONE) immediate release tablet 5 mg, 5 mg, Oral, Q4H PRN, Nate Burdick MD    [Transfer Hold] oxyCODONE HCl (OXY-IR) immediate release tablet 10 mg, 10 mg, Oral, Q4H PRN, Nate Burdick MD    [Transfer Hold] morphine (PF) injection 1 mg, 1 mg, IntraVENous, Q4H PRN, Nate Burdick MD    [Transfer Hold] morphine (PF) injection 2 mg, 2 mg, IntraVENous, Q4H PRN, Nate Burdick MD    [Transfer Hold] morphine sulfate (PF) injection 4 mg, 4 mg, IntraVENous, Q4H PRN, Nate Burdick MD    [Transfer Hold] naloxone (NARCAN) injection 0.4 mg, 0.4 mg, IntraVENous, PRN, Nate Burdick MD    [Transfer Hold] acetaminophen (TYLENOL) tablet 1,000 mg, 1,000 mg, Oral, 3 times per day, Nate Burdick MD, 1,000 mg at 11/23/24 0809    [Transfer Hold] ondansetron (ZOFRAN) injection 4 mg, 4 mg, IntraVENous, Q6H PRN, Nate Burdick MD    [Transfer Hold] promethazine (PHENERGAN) injection 12.5 mg, 12.5 mg, IntraMUSCular, Q6H PRN, Nate Burdick MD, 12.5 mg at 11/22/24 2307    [Transfer Hold] prochlorperazine (COMPAZINE) suppository 25 mg, 25 mg, Rectal, Q12H PRN, Nate Burdick MD    [Transfer Hold] dilTIAZem (CARDIZEM) tablet 60 mg, 60 mg, Oral, BID, Nate Burdick MD    [Transfer Hold] furosemide (LASIX) tablet 20 mg, 20 mg, Oral, BID, Nate Burdick MD    [Transfer Hold] levothyroxine (SYNTHROID) tablet 100 mcg, 100 mcg, Oral, QAM AC, Nate Burdick MD    [Transfer Hold] propranolol (INDERAL LA) extended release capsule 60 mg, 60 mg, Oral, Daily, Nate Burdick MD    [Transfer Hold] lisinopril (PRINIVIL;ZESTRIL) tablet 20 mg, 20 mg, Oral, Daily, Nate Burdick MD    [Transfer Hold] polyethylene glycol (GLYCOLAX) packet 17 g, 17 g, Oral, Daily PRN, Nate Burdick MD    [Transfer Hold] melatonin disintegrating tablet 5 mg, 5 mg, Oral, Nightly PRN, Nate Burdick MD    [Transfer Hold] calcium carbonate (TUMS)

## 2024-11-24 NOTE — PLAN OF CARE
Problem: Discharge Planning  Goal: Discharge to home or other facility with appropriate resources  Outcome: Progressing     Problem: Skin/Tissue Integrity  Goal: Absence of new skin breakdown  Description: 1.  Monitor for areas of redness and/or skin breakdown  2.  Assess vascular access sites hourly  3.  Every 4-6 hours minimum:  Change oxygen saturation probe site  4.  Every 4-6 hours:  If on nasal continuous positive airway pressure, respiratory therapy assess nares and determine need for appliance change or resting period.  Outcome: Progressing     Problem: Safety - Adult  Goal: Free from fall injury  Outcome: Progressing     Problem: ABCDS Injury Assessment  Goal: Absence of physical injury  Outcome: Progressing     Problem: Respiratory - Adult  Goal: Achieves optimal ventilation and oxygenation  Outcome: Progressing     Problem: Cardiovascular - Adult  Goal: Maintains optimal cardiac output and hemodynamic stability  Outcome: Progressing  Goal: Absence of cardiac dysrhythmias or at baseline  Outcome: Progressing     Problem: Skin/Tissue Integrity - Adult  Goal: Skin integrity remains intact  Outcome: Progressing  Goal: Incisions, wounds, or drain sites healing without S/S of infection  Outcome: Progressing     Problem: Musculoskeletal - Adult  Goal: Return mobility to safest level of function  Outcome: Progressing  Goal: Maintain proper alignment of affected body part  Outcome: Progressing

## 2024-11-24 NOTE — DISCHARGE SUMMARY
noted.  Following discussion regarding goals of care with family, decision was made to withdraw care.    Time of death: 1409 on 11/24/24    Cause of death: Cardiogenic shock, acute right-sided heart failure      Physical Exam:  Vital Signs: BP (!) 148/77   Pulse 76   Temp (!) 95.9 °F (35.5 °C) (Temporal)   Resp (!) 0   Ht 1.753 m (5' 9\")   Wt 63.5 kg (140 lb)   SpO2 (!) 78%   BMI 20.67 kg/m²   General appearance:.Unresponsive  Chest: No spontaneous respirations  Cardiac: Absent heart tones  Extremities: Cool extremities  Neurologic: Unresponsive        Time spent on discharge 35 minutes      Signed:  Esequiel Townsend MD  11/24/2024 2:57 PM

## 2024-11-24 NOTE — CONSENT
Informed Consent for Blood Component Transfusion Note    I have discussed with the son the rationale for blood component transfusion; its benefits in treating or preventing fatigue, organ damage, or death; and its risk which includes mild transfusion reactions, rare risk of blood borne infection, or more serious but rare reactions. I have discussed the alternatives to transfusion, including the risk and consequences of not receiving transfusion. The son had an opportunity to ask questions and had agreed to proceed with transfusion of blood components.    Electronically signed by Nate Monson MD on 11/24/24 at 7:15 AM CST

## 2024-11-26 LAB
EKG P AXIS: NORMAL DEGREES
EKG P-R INTERVAL: NORMAL MS
EKG Q-T INTERVAL: 318 MS
EKG QRS DURATION: 96 MS
EKG QTC CALCULATION (BAZETT): 358 MS
EKG T AXIS: 138 DEGREES

## (undated) DEVICE — BLADE, TONGUE, 6", STERILE: Brand: MEDLINE

## (undated) DEVICE — TENSIONER SUTURE

## (undated) DEVICE — SOLUTION IV 250ML 0.9% SOD CHL PH 5 INJ USP VIAFLX PLAS

## (undated) DEVICE — SUTURE NONABSORBABLE MONOFILAMENT 6-0 RB-2 4X30 IN PROLENE M8711

## (undated) DEVICE — CURAVIEW LED LARYNGOSCOPE BLADE & HANDLE,DISPOSABLE,MAC 3: Brand: CURAPLEX

## (undated) DEVICE — Device

## (undated) DEVICE — SUTURE PROL SZ 5-0 L36IN NONABSORBABLE BLU RB-1 L17MM 1/2 M8556

## (undated) DEVICE — GLOVE SURG SZ 65 L12IN FNGR THK79MIL GRN LTX FREE

## (undated) DEVICE — POSITIONER SURG VAC ACROBAT-I

## (undated) DEVICE — ACCESSORY TOWEL PACK: Brand: MEDLINE INDUSTRIES, INC.

## (undated) DEVICE — GUIDEWIRE ORTH L400MM DIA3.2MM FOR TFN

## (undated) DEVICE — SUTURE ETHIBOND EXCEL SZ 2-0 L36IN NONABSORBABLE GRN L26MM SH X523H

## (undated) DEVICE — DRAPE SLUSH DISC W44XL66IN ST FOR RND BSIN HUSH SLUSH SYS

## (undated) DEVICE — AGENT HEMOSTATIC SURGIFLOW MATRIX KIT W/THROMBIN

## (undated) DEVICE — KIT CATH 18GA L6IN FEM ART LN W/ INTEGR SUT WNG 0.25X17

## (undated) DEVICE — SPONGE LAP W18XL18IN WHT COT 4 PLY FLD STRUNG RADPQ DISP ST 2 PER PACK

## (undated) DEVICE — SOLUTION IV 1000ML 0.9% SOD CHL PH 5 INJ USP VIAFLX PLAS

## (undated) DEVICE — YANKAUER,TAPERED BULBOUS TIP,W/O VENT: Brand: MEDLINE

## (undated) DEVICE — 3M™ STERI-STRIP™ REINFORCED ADHESIVE SKIN CLOSURES, R1546, 1/4 IN X 4 IN (6 MM X 100 MM), 10 STRIPS/ENVELOPE: Brand: 3M™ STERI-STRIP™

## (undated) DEVICE — DRAIN SURG SGL COLL PT TB FOR ATS BG OASIS

## (undated) DEVICE — STERNUM BLADE, OFFSET (32.0 X 0.8 X 6.3MM)

## (undated) DEVICE — BRONCHOSCOPE GS BFLEX 2 LARGE 5.8 SU

## (undated) DEVICE — DRAIN SURG L3/8-1/2IN DIA3/16IN SIL CARD CONN 1:1 BLAK

## (undated) DEVICE — SUTURE ETHIBOND N ABSRB BRAIDED 2-0 RB1 30 IN COAT WHT EXCEL MX523

## (undated) DEVICE — 3M™ TEGADERM™ TRANSPARENT FILM DRESSING FRAME STYLE, 1628, 6 IN X 8 IN (15 CM X 20 CM), 10/CT 8CT/CASE: Brand: 3M™ TEGADERM™

## (undated) DEVICE — AORTIC PUNCHES ARE USED TO CREATE A UNIFORM OPENING IN BLOOD VESSELS DURING CARDIOVASCULAR SURGERY. THE VESSEL GRAFT IS INSERTED INTO THE CREATED OPENING AND SUTURED TO THE VESSEL WALL. AORTIC LANCETS ARE USED TO MAKE A SMALL UNIFORM CUT IN A BLOOD VESSEL TO FACILITATE INSERTION OF AN AORTIC PUNCH.  PUNCHES COME IN VARIOUS LENGTHS, DIAMETERS AND TIP CONFIGURATIONS.: Brand: CLEANCUT ROTATING AORTIC PUNCH

## (undated) DEVICE — SOLUTION IV 500ML 0.9% SOD CHL PH 5 INJ USP VIAFLX PLAS

## (undated) DEVICE — ELECTRODE ELECSURG L 10.2 CM PTFE COAT MONOPOLAR BLADE NSTK

## (undated) DEVICE — Device: Brand: SADDLELOOP 18G X 10CM WITH BLUNT NEEDLE

## (undated) DEVICE — CATHETER IABP 8FR 50CC FBROPT CONN W INSRT KT TWO STATLOK

## (undated) DEVICE — SUTURE SZ 7 L18IN NONABSORBABLE SIL CCS L48MM 1/2 CIR STRNM M655G

## (undated) DEVICE — BLADE OPHTH 180DEG CUT SURF BLU STR SHRP DBL BVL GRINDLESS

## (undated) DEVICE — DRESSING MEPILEX BORDER FLEX LITE 5X12.5CM

## (undated) DEVICE — ULTRACLEAN ACCESSORY ELECTRODE 4" (10.16 CM) COATED BLADE WITH EXTENDED INSULATION: Brand: ULTRACLEAN

## (undated) DEVICE — TUBE ET 7MM NSL ORAL BASIC CUF INTMED MURPHY EYE RADPQ MRK

## (undated) DEVICE — SYSTEM ENDOSCP VES HARV W/ TOOL CANN SEAL SHT PRT BLNT TIP

## (undated) DEVICE — BIT DRL L145MM DIA4.2MM NONSTERILE 3 FLUT NDL PNT QUIK CPL

## (undated) DEVICE — WRAP AROUND LENS-STERLIE

## (undated) DEVICE — SOLUTION IV 1000ML 140MEQ/L SOD 5MEQ/L K 3MEQ/L MG 27MEQ/L

## (undated) DEVICE — BIT DRL L L266MM DIA16MM FEM FLX CANN QUIK CPL

## (undated) DEVICE — AEGIS 1" DISK 4MM HOLE, PEEL OPEN: Brand: MEDLINE

## (undated) DEVICE — BLANKET THER AD W24XL60IN FAB COVERING SUP SFT ULT THN LTWT

## (undated) DEVICE — DRESSING FOAM W4XL12IN SIL RECT ADH WTRPRF FLM BK W/ BORD

## (undated) DEVICE — C-ARM: Brand: UNBRANDED

## (undated) DEVICE — SUTURE PROL SZ 4-0 L36IN NONABSORBABLE BLU L17MM RB-1 1/2 M8557

## (undated) DEVICE — PACK,UNIVERSAL,NO GOWNS: Brand: MEDLINE

## (undated) DEVICE — BIT DRL L500MM DIA6X9MM CANN STP L QUIK CPL FOR DH DC TFN

## (undated) DEVICE — ROD RMR L950MM DIA2.5MM W/ EXTN BALL TIP

## (undated) DEVICE — FIBERTAPE STERNAL CLOSURE WITH CUTTING NDL

## (undated) DEVICE — 6617 IOBAN II PATIENT ISOLATION DRAPE 5/BX,4BX/CS: Brand: STERI-DRAPE™ IOBAN™ 2

## (undated) DEVICE — STABILIZER SURG VAC STD BLADE POD MALL FT ACROBATI

## (undated) DEVICE — DRAIN,WOUND,ROUND,24FR,5/16",FULL-FLUTED: Brand: MEDLINE

## (undated) DEVICE — GLOVE SURG 7.5 PF POLYMER WHT STRL SIGN LTX ESSENTIAL LTX

## (undated) DEVICE — SUTURE ABSORBABLE MONOFILAMENT 0 CTX 36 IN VIO PDS + PDP370T

## (undated) DEVICE — SET CATH 20GA L1.75IN RAD ART POLYUR RADPQ W/ INTEGR

## (undated) DEVICE — PENCIL BTTN S S CAUT TIP W HOLSTER 25 50

## (undated) DEVICE — COVER,TABLE,44X90,STERILE: Brand: MEDLINE

## (undated) DEVICE — SUTURE VICRYL + SZ 2-0 L36IN ABSRB UD L36MM CT-1 1/2 CIR VCP945H

## (undated) DEVICE — SUTURE PROL SZ 7-0 L24IN NONABSORBABLE BLU L9.3MM BV-1 3/8 M8702

## (undated) DEVICE — TUBE ET 8MM NSL ORAL BASIC CUF INTMED MURPHY EYE RADPQ MRK

## (undated) DEVICE — SHEET,DRAPE,53X77,STERILE: Brand: MEDLINE

## (undated) DEVICE — TUBING, SUCTION, 1/4" X 20', STRAIGHT: Brand: MEDLINE INDUSTRIES, INC.

## (undated) DEVICE — ULTRACLEAN ACCESSORY ELECTRODE 1" (2.54 CM) COATED BLADE: Brand: ULTRACLEAN

## (undated) DEVICE — KIT BLWR MISTER 5P 15L W/ TBNG SET IRRIG MIST TO IMPROVE

## (undated) DEVICE — TUBING O2 SM ORG STRL

## (undated) DEVICE — DRESSING FOAM 0.75IN 1.5MM H DISK CHG IMPREG AEGIS

## (undated) DEVICE — ADHESIVE SKIN CLOSURE WND 8.661X1.5 IN 22 CM LIQUIBAND SECUR

## (undated) DEVICE — BLADE LARYNSCP STERILE SZ 4 TI DISP SPECTRM LOPRO GLIDESCOPE

## (undated) DEVICE — DRAPE,U/ SHT,SPLIT,PLAS,STERIL: Brand: MEDLINE

## (undated) DEVICE — TUBE ET 7.5MM NSL ORAL BASIC CUF INTMED MURPHY EYE RADPQ

## (undated) DEVICE — COVER TRANSDUCER TELESCOPICALLY FOLDED 3.5X36 IN CIV-FLEX

## (undated) DEVICE — OPTIFOAM GENTLE SA, POSTOP, 4X8: Brand: MEDLINE

## (undated) DEVICE — AGENT HEMSTAT W4XL4IN OXIDIZED REGENERATED CELOS STRUCTURED

## (undated) DEVICE — SWAN-GANZ CCOMBO V THERMODILUTION CATHETER: Brand: SWAN-GANZ CCOMBO V

## (undated) DEVICE — SUTURE PERMAHAND SZ 2-0 L30IN NONABSORBABLE BLK SH L26MM C016D

## (undated) DEVICE — DEVICE RESUS AD TB L40IN SELF INFL MASK TEXT BG DBL SWVL EL

## (undated) DEVICE — BIT DRL L300MM DIA10MM CANN TAPR L QUIK CPL FOR DH DC TFN

## (undated) DEVICE — KIT SHTH INTRO 9FR L10CM PERC INTEGR HEMSTAS VLV SIDEPRT

## (undated) DEVICE — PREP RAZOR: Brand: DEROYAL

## (undated) DEVICE — SUTURE MONOCRYL SZ 4-0 L18IN ABSRB UD L19MM PS-2 3/8 CIR PRIM Y496G